# Patient Record
Sex: FEMALE | Race: OTHER | HISPANIC OR LATINO | Employment: FULL TIME | URBAN - METROPOLITAN AREA
[De-identification: names, ages, dates, MRNs, and addresses within clinical notes are randomized per-mention and may not be internally consistent; named-entity substitution may affect disease eponyms.]

---

## 2023-03-21 LAB
APPEARANCE, URINE: NORMAL
BILIRUBIN, URINE: NEGATIVE
BLOOD, URINE: NEGATIVE
COLOR, URINE: YELLOW
GLUCOSE, URINE: NEGATIVE MG/DL
KETONES, URINE: NEGATIVE MG/DL
LEUKOCYTE ESTERASE, URINE: NEGATIVE
NITRITE, URINE: NEGATIVE
PH, URINE: 6 (ref 5–8)
PROTEIN, URINE: NEGATIVE MG/DL
SPECIFIC GRAVITY, URINE: 1.01 (ref 1–1.03)
UROBILINOGEN, URINE: <2 MG/DL (ref 0–1.9)

## 2023-03-22 LAB — URINE CULTURE: NO GROWTH

## 2023-11-07 DIAGNOSIS — R39.9 URINARY SYMPTOM OR SIGN: ICD-10-CM

## 2023-11-08 ENCOUNTER — LAB (OUTPATIENT)
Dept: LAB | Facility: LAB | Age: 23
End: 2023-11-08
Payer: COMMERCIAL

## 2023-11-08 DIAGNOSIS — R39.9 URINARY SYMPTOM OR SIGN: ICD-10-CM

## 2023-11-08 PROCEDURE — 87086 URINE CULTURE/COLONY COUNT: CPT

## 2023-11-08 PROCEDURE — 87186 SC STD MICRODIL/AGAR DIL: CPT

## 2023-11-08 RX ORDER — NITROFURANTOIN 25; 75 MG/1; MG/1
100 CAPSULE ORAL 2 TIMES DAILY
Qty: 14 CAPSULE | Refills: 0 | Status: SHIPPED | OUTPATIENT
Start: 2023-11-08 | End: 2023-11-15

## 2023-11-10 LAB — BACTERIA UR CULT: ABNORMAL

## 2023-12-26 DIAGNOSIS — R39.9 URINARY SYMPTOM OR SIGN: ICD-10-CM

## 2023-12-27 ENCOUNTER — LAB (OUTPATIENT)
Dept: LAB | Facility: LAB | Age: 23
End: 2023-12-27
Payer: COMMERCIAL

## 2023-12-27 DIAGNOSIS — R39.9 URINARY SYMPTOM OR SIGN: ICD-10-CM

## 2023-12-27 PROCEDURE — 87186 SC STD MICRODIL/AGAR DIL: CPT

## 2023-12-27 PROCEDURE — 87086 URINE CULTURE/COLONY COUNT: CPT

## 2023-12-28 ENCOUNTER — OFFICE VISIT (OUTPATIENT)
Dept: PRIMARY CARE | Facility: CLINIC | Age: 23
End: 2023-12-28
Payer: COMMERCIAL

## 2023-12-28 VITALS
HEIGHT: 60 IN | DIASTOLIC BLOOD PRESSURE: 74 MMHG | BODY MASS INDEX: 22.97 KG/M2 | WEIGHT: 117 LBS | RESPIRATION RATE: 16 BRPM | OXYGEN SATURATION: 98 % | SYSTOLIC BLOOD PRESSURE: 110 MMHG | HEART RATE: 68 BPM

## 2023-12-28 DIAGNOSIS — Q05.7: ICD-10-CM

## 2023-12-28 DIAGNOSIS — N31.9 NEUROGENIC BLADDER: ICD-10-CM

## 2023-12-28 DIAGNOSIS — M53.3 SACRAL PAIN: ICD-10-CM

## 2023-12-28 DIAGNOSIS — Z00.00 ROUTINE HEALTH MAINTENANCE: Primary | ICD-10-CM

## 2023-12-28 DIAGNOSIS — Z98.2 VP (VENTRICULOPERITONEAL) SHUNT STATUS: ICD-10-CM

## 2023-12-28 PROBLEM — Z86.2 HISTORY OF ANEMIA: Status: ACTIVE | Noted: 2023-12-28

## 2023-12-28 PROBLEM — M21.6X1: Status: ACTIVE | Noted: 2023-12-28

## 2023-12-28 PROBLEM — Q05.8: Status: ACTIVE | Noted: 2023-12-28

## 2023-12-28 PROBLEM — H52.203 ASTIGMATISM OF BOTH EYES: Status: ACTIVE | Noted: 2023-12-28

## 2023-12-28 PROBLEM — F32.A DEPRESSION: Status: ACTIVE | Noted: 2023-12-28

## 2023-12-28 PROBLEM — H52.13 MYOPIA OF BOTH EYES: Status: ACTIVE | Noted: 2023-12-28

## 2023-12-28 PROBLEM — Z98.890 HISTORY OF SURGICAL PROCEDURE: Status: ACTIVE | Noted: 2023-12-28

## 2023-12-28 PROBLEM — Q05.9 SPINA BIFIDA (MULTI): Status: ACTIVE | Noted: 2023-12-28

## 2023-12-28 PROBLEM — R51.9 FREQUENT HEADACHES: Status: ACTIVE | Noted: 2023-12-28

## 2023-12-28 PROBLEM — R33.9 URINARY RETENTION WITH INCOMPLETE BLADDER EMPTYING: Status: ACTIVE | Noted: 2022-09-09

## 2023-12-28 PROBLEM — R41.840 INATTENTION: Status: ACTIVE | Noted: 2023-12-28

## 2023-12-28 PROBLEM — Q05.2 CLOSED LUMBAR SPINA BIFIDA WITH HYDROCEPHALUS (MULTI): Status: ACTIVE | Noted: 2023-12-28

## 2023-12-28 PROBLEM — F41.9 ANXIETY DISORDER: Status: ACTIVE | Noted: 2023-12-28

## 2023-12-28 PROBLEM — N21.0 BLADDER CALCULUS: Status: ACTIVE | Noted: 2023-02-03

## 2023-12-28 PROBLEM — N32.89 BLADDER TRABECULATION: Status: ACTIVE | Noted: 2023-12-28

## 2023-12-28 PROBLEM — K59.09 CHRONIC CONSTIPATION: Status: ACTIVE | Noted: 2023-12-28

## 2023-12-28 PROCEDURE — 99204 OFFICE O/P NEW MOD 45 MIN: CPT | Performed by: STUDENT IN AN ORGANIZED HEALTH CARE EDUCATION/TRAINING PROGRAM

## 2023-12-28 PROCEDURE — 1036F TOBACCO NON-USER: CPT | Performed by: STUDENT IN AN ORGANIZED HEALTH CARE EDUCATION/TRAINING PROGRAM

## 2023-12-28 ASSESSMENT — ENCOUNTER SYMPTOMS
NUMBNESS: 0
DIZZINESS: 0
MYALGIAS: 1
DIAPHORESIS: 0
DIARRHEA: 0
ARTHRALGIAS: 0
WEAKNESS: 0
CHILLS: 0
NECK STIFFNESS: 0
NECK PAIN: 0
LIGHT-HEADEDNESS: 0
DYSURIA: 0
SHORTNESS OF BREATH: 0
HEADACHES: 1
FEVER: 0
NAUSEA: 0
VOMITING: 0

## 2023-12-28 NOTE — PROGRESS NOTES
Subjective   Patient ID: Slade Hauser is a 23 y.o. female who presents for Establish Care.    Pt is here today to establish care. Pt has spina bifida and has a lot of back pain that started 2 weeks ago near her scar. Pt had surgery right after she was born    Pt has a shunt on the right side of her head from her head to her. Pt had shunt placed at 6 days old and was revised at 12years old.     Pt uses urinary catheter.          Review of Systems    Objective   /74   Pulse 68   Resp 16   Ht 1.524 m (5')   Wt 53.1 kg (117 lb)   SpO2 98%   BMI 22.85 kg/m²     Physical Exam    Assessment/Plan

## 2023-12-28 NOTE — PATIENT INSTRUCTIONS
Please call Dr. Willa Barriga's office to see if you can see her for follow up. The number to schedule an appointment is [phone].  Please get fasting labs at the  lab at your convenience.  Follow up with me in 1-3 months for physical, sooner if needed.   Please go to the ER if fevers, chills, sweats, weakness, numbness/tingling, loss of control of stool, any concerning/worsening symptoms.

## 2023-12-28 NOTE — PROGRESS NOTES
Subjective   Patient ID: Slade Hauser is a 23 y.o. female who presents for Establish Care.  She is here to establish care.  Past medical history significant for closed lumbar spina bifida with hydrocephalus status post  shunt, neurogenic bladder with self-catheterization, history of bladder calculus.    Pt has spina bifida and has sacral pain that started 2 weeks ago near her scar. Pain feels like a pulled muscle, like a bruise. Pain is non radiating. Pain present all day for last 2 weeks. No numbness, tingling, weakness. Unable to bend forward due to pain from low back. Used to [facility] but hasn't recently-she is unsure where to go for this. Reports having a shunt on the right side of her head from her head to her low back. Pt had shunt placed at 6 days old and was revised at 12 years old.      Pt uses urinary catheter to self catheter 4 times a day.        Review of Systems   Constitutional:  Negative for chills, diaphoresis and fever.   HENT:  Negative for hearing loss.    Eyes:  Negative for visual disturbance.   Respiratory:  Negative for shortness of breath.    Cardiovascular:  Negative for chest pain.   Gastrointestinal:  Negative for diarrhea, nausea and vomiting.   Endocrine: Negative for cold intolerance and heat intolerance.   Genitourinary:  Negative for dysuria.   Musculoskeletal:  Positive for myalgias (bilateral lower extremities for 2 weeks). Negative for arthralgias, neck pain and neck stiffness.   Skin:  Negative for rash.   Neurological:  Positive for headaches (intermittent headaches for 2 weeks). Negative for dizziness, syncope, weakness, light-headedness and numbness.       /74   Pulse 68   Resp 16   Ht 1.524 m (5')   Wt 53.1 kg (117 lb)   SpO2 98%   BMI 22.85 kg/m²     Objective   Physical Exam  Vitals reviewed.   HENT:      Head: Normocephalic.   Cardiovascular:      Rate and Rhythm: Normal rate and regular rhythm.   Pulmonary:      Effort: Pulmonary effort is  normal. No respiratory distress.      Breath sounds: Normal breath sounds.   Abdominal:      General: There is no distension.   Musculoskeletal:         General: No deformity.      Comments: Tenderness over sacrum bilaterally, minimal swelling over right portion of sacrum and no fluctuance-this area not more significantly tender than rest of sacrum. Erythema over sacrum and per patient has been unchanged.    Skin:     Coloration: Skin is not jaundiced.   Neurological:      General: No focal deficit present.      Mental Status: She is alert.      Cranial Nerves: No cranial nerve deficit.      Sensory: No sensory deficit.      Motor: No weakness.      Coordination: Coordination normal.      Gait: Gait normal.   Psychiatric:         Mood and Affect: Mood normal.         Behavior: Behavior normal.         Assessment/Plan   Problem List Items Addressed This Visit       Sacral pain    Relevant Orders    XR sacrum coccyx 2+ views     (ventriculoperitoneal) shunt status    Spina bifida (CMS/HCC)    Neurogenic bladder     Other Visit Diagnoses       Routine health maintenance    -  Primary    Relevant Orders    Comprehensive Metabolic Panel    Lipid Panel    CBC    TSH with reflex to Free T4 if abnormal        Spina bifida  Neurogenic bladder  - Sees urology, just saw Dr. Jensen August 2023.  -Has not seen neurosurgery since 2020, re referred to establish care and provided contact info  -I reached out to her neurosurgeon that she last saw to coordinate care.   -Sacral xray today   -Defer further imaging to neurosurgery given no significant neurologic findings  -ER for fevers, chills, sweats, weakness, numbness/tingling, loss of control of stool, any concerning/worsening symptoms.     Labs ordered  CPE 1-3 months, f/u sooner PRN  Lives at home with mom and dad and siblings. Works as .

## 2023-12-29 DIAGNOSIS — R39.9 URINARY SYMPTOM OR SIGN: ICD-10-CM

## 2023-12-29 RX ORDER — NITROFURANTOIN 25; 75 MG/1; MG/1
100 CAPSULE ORAL 2 TIMES DAILY
Qty: 14 CAPSULE | Refills: 0 | Status: SHIPPED | OUTPATIENT
Start: 2023-12-29 | End: 2024-01-02 | Stop reason: ALTCHOICE

## 2023-12-30 LAB — BACTERIA UR CULT: ABNORMAL

## 2024-01-02 RX ORDER — SULFAMETHOXAZOLE AND TRIMETHOPRIM 800; 160 MG/1; MG/1
1 TABLET ORAL 2 TIMES DAILY
Qty: 14 TABLET | Refills: 0 | Status: SHIPPED | OUTPATIENT
Start: 2024-01-02 | End: 2024-01-09

## 2024-01-03 ENCOUNTER — ANCILLARY PROCEDURE (OUTPATIENT)
Dept: RADIOLOGY | Facility: CLINIC | Age: 24
End: 2024-01-03
Payer: COMMERCIAL

## 2024-01-03 DIAGNOSIS — M53.3 SACRAL PAIN: ICD-10-CM

## 2024-01-03 PROCEDURE — 72220 X-RAY EXAM SACRUM TAILBONE: CPT

## 2024-01-03 PROCEDURE — 72220 X-RAY EXAM SACRUM TAILBONE: CPT | Performed by: RADIOLOGY

## 2024-01-19 ENCOUNTER — TELEMEDICINE (OUTPATIENT)
Dept: NEUROSURGERY | Facility: HOSPITAL | Age: 24
End: 2024-01-19
Payer: COMMERCIAL

## 2024-01-19 DIAGNOSIS — Z98.2 VP (VENTRICULOPERITONEAL) SHUNT STATUS: Primary | ICD-10-CM

## 2024-01-19 DIAGNOSIS — Q05.2 CLOSED LUMBAR SPINA BIFIDA WITH HYDROCEPHALUS (MULTI): ICD-10-CM

## 2024-01-19 PROBLEM — Q05.8: Status: RESOLVED | Noted: 2023-12-28 | Resolved: 2024-01-19

## 2024-01-19 PROCEDURE — 99213 OFFICE O/P EST LOW 20 MIN: CPT | Performed by: NEUROLOGICAL SURGERY

## 2024-01-19 PROCEDURE — 99213 OFFICE O/P EST LOW 20 MIN: CPT | Mod: GT | Performed by: NEUROLOGICAL SURGERY

## 2024-01-19 ASSESSMENT — ENCOUNTER SYMPTOMS: BACK PAIN: 1

## 2024-01-19 NOTE — ASSESSMENT & PLAN NOTE
Back and leg pain with increased UTI frequency could be secondary to retethering. Will order MRI L-spine to evaluate and will call with results.

## 2024-01-19 NOTE — PROGRESS NOTES
Subjective     Slade Hauser is a 23 y.o.  female presenting for their annual myelo clinic visit. 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     Their hydrocephalus was treated with a shunt. The shunt was inserted in infancy, last revised in 2012 when they presented for distal lengthening. They have a fixed pressure valve.    Current symptoms include: back pain and leg weakness.    They are currently ambulatory.  They cath q 4 hours. She has UTI's frequently, she feels an increased frequency lately.  She is working as a .    Review of Systems   Musculoskeletal:  Positive for back pain.   All other systems reviewed and are negative.      Objective   There were no vitals taken for this visit.    Physical Exam:  Able to stand, gait steady, high arches in her feet with limited dorsi/plantarflexion.  Incision without any retraction.      Assessment/Plan   Slade Hauser is a 23 y.o. with a history of myelomeningocele with hydrocephalus.  I have no concerns for shunt malfunction at this time. I have concerns for retethering at this time.    I would like to order imaging to evaluate their spinal cord for tethering. I will see them annually in myelo clinic. They have been instructed to call with any concerns in the interim. We reviewed the concerning symptoms to watch out for including headache, nausea/vomiting, worsening weakness or numbness, back pain, sleepiness, worsening scoliosis, worsening bowel or bladder dysfunction, increasing frequencies of urinary tract infections, difficulty swallowing, or other changes from baseline.    Problem List Items Addressed This Visit             ICD-10-CM     (ventriculoperitoneal) shunt status - Primary Z98.2    Relevant Orders    MR lumbar spine wo IV contrast    Closed lumbar spina bifida with hydrocephalus (CMS/HCC) Q05.2     Back and leg pain with increased UTI frequency could be secondary to retethering. Will order MRI L-spine  to evaluate and will call with results.         Relevant Orders    MR lumbar spine wo IV contrast

## 2024-02-09 ENCOUNTER — APPOINTMENT (OUTPATIENT)
Dept: RADIOLOGY | Facility: CLINIC | Age: 24
End: 2024-02-09
Payer: COMMERCIAL

## 2024-02-23 ENCOUNTER — APPOINTMENT (OUTPATIENT)
Dept: RADIOLOGY | Facility: CLINIC | Age: 24
End: 2024-02-23
Payer: COMMERCIAL

## 2024-03-08 ENCOUNTER — APPOINTMENT (OUTPATIENT)
Dept: RADIOLOGY | Facility: CLINIC | Age: 24
End: 2024-03-08
Payer: COMMERCIAL

## 2024-03-19 ENCOUNTER — HOSPITAL ENCOUNTER (OUTPATIENT)
Dept: RADIOLOGY | Facility: CLINIC | Age: 24
Discharge: HOME | End: 2024-03-19
Payer: COMMERCIAL

## 2024-03-19 DIAGNOSIS — Z98.2 VP (VENTRICULOPERITONEAL) SHUNT STATUS: ICD-10-CM

## 2024-03-19 DIAGNOSIS — Q05.2 CLOSED LUMBAR SPINA BIFIDA WITH HYDROCEPHALUS (MULTI): ICD-10-CM

## 2024-03-19 PROCEDURE — 72148 MRI LUMBAR SPINE W/O DYE: CPT | Performed by: RADIOLOGY

## 2024-03-19 PROCEDURE — 72148 MRI LUMBAR SPINE W/O DYE: CPT

## 2024-04-02 DIAGNOSIS — N39.0 CHRONIC UTI: ICD-10-CM

## 2024-04-02 DIAGNOSIS — N39.0 ACUTE UTI: Primary | ICD-10-CM

## 2024-04-02 RX ORDER — SULFAMETHOXAZOLE AND TRIMETHOPRIM 800; 160 MG/1; MG/1
1 TABLET ORAL 2 TIMES DAILY
Qty: 14 TABLET | Refills: 0 | Status: SHIPPED | OUTPATIENT
Start: 2024-04-02 | End: 2024-04-09

## 2024-04-17 ENCOUNTER — HOME INFUSION (OUTPATIENT)
Dept: INFUSION THERAPY | Age: 24
End: 2024-04-17

## 2024-04-17 NOTE — PROGRESS NOTES
Received notice from  that insurance has been termed since 02/29/24. Patient unable to afford self-pay rates. Pharmacy will be unable to provide gentamicin bladder irrigations starting this week.  Chat sent to Dr Lehman and Dr Gitlin regarding inability to provide medications.  Fill voided for 04/22.    Will keep chart active in case of change in insurance status or new orders.

## 2024-04-24 ENCOUNTER — TELEMEDICINE (OUTPATIENT)
Dept: NEUROSURGERY | Facility: HOSPITAL | Age: 24
End: 2024-04-24

## 2024-04-24 DIAGNOSIS — Z98.2 VP (VENTRICULOPERITONEAL) SHUNT STATUS: Primary | ICD-10-CM

## 2024-04-24 DIAGNOSIS — R33.9 URINARY RETENTION WITH INCOMPLETE BLADDER EMPTYING: ICD-10-CM

## 2024-04-24 DIAGNOSIS — Q06.8 TETHERED SPINAL CORD (MULTI): ICD-10-CM

## 2024-04-24 DIAGNOSIS — Q05.2 CLOSED LUMBAR SPINA BIFIDA WITH HYDROCEPHALUS (MULTI): ICD-10-CM

## 2024-04-24 PROCEDURE — 99214 OFFICE O/P EST MOD 30 MIN: CPT | Mod: 95 | Performed by: NEUROLOGICAL SURGERY

## 2024-04-24 PROCEDURE — 99214 OFFICE O/P EST MOD 30 MIN: CPT | Performed by: NEUROLOGICAL SURGERY

## 2024-04-24 ASSESSMENT — ENCOUNTER SYMPTOMS
FREQUENCY: 1
BACK PAIN: 1

## 2024-04-24 NOTE — PROGRESS NOTES
Subjective     Slade Hauser is a 23 y.o.  female presenting for their annual myelo clinic visit. 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     Their hydrocephalus was treated with a shunt. The shunt was inserted in infancy, last revised in 2012 when they presented for distal lengthening. They have a fixed pressure valve.    Current symptoms include: back pain and leg weakness.  Slade reports leg weakness being better than previously reported.      They are currently ambulatory.  They cath q 4 hours. She has UTI's frequently, she feels an increased frequency lately, Katy reports that she is having these UTIs as frequent as every 3 months and is finishing up a course of antibiotics now for it. No change in her cath technique. She denies change in her bowel habits.    She is working as a  and working longer hours.  She reports after these hours she is having increased back pain. She describes this in the midline, an aching sensation. It is worse when standing for prolonged periods. She has pain with both flexion and extension, worse with flexion. Worsening through the day.     Review of Systems   Genitourinary:  Positive for frequency.   Musculoskeletal:  Positive for back pain.   All other systems reviewed and are negative.      Objective   There were no vitals taken for this visit.    No exam performed    Imaging: Her MRI was personally reviewed which demonstrates significant scarring at the level of her placode and a spondylolisthesis at L5-S1.       Assessment/Plan   Slade Hauser is a 23 y.o. with a history of myelomeningocele with hydrocephalus.      Problem List Items Addressed This Visit             ICD-10-CM     (ventriculoperitoneal) shunt status - Primary Z98.2    Urinary retention with incomplete bladder emptying R33.9    Closed lumbar spina bifida with hydrocephalus (Multi) Q05.2     In reviewing her MRI, she does have a significantly low-lying  placode with scar tissue that is visible.  Certainly her urinary symptoms would support the diagnosis of a tethered cord.  However she does have spondylolisthesis of L5 on S1 that I need to investigate further to ensure that this is not the primary cause of her back pain.  Would like to order lumbar spine x-rays with flexion-extension views to evaluate for dynamic instability.  I will also obtain oblique views to evaluate for pars defects.  If she does have instability, it is possible that both the tethered cord and instability would need to be addressed concurrently.  If she does not have any instability, then would be able to proceed with just a tethered cord release.  I will plan on seeing her in clinic for an in person evaluation with x-rays to discuss plans for intervention.          Other Visit Diagnoses         Codes    Tethered spinal cord (Multi)     Q06.8    Relevant Orders    XR lumbar spine 6+ views including oblique flexion extension

## 2024-04-24 NOTE — ASSESSMENT & PLAN NOTE
In reviewing her MRI, she does have a significantly low-lying placode with scar tissue that is visible.  Certainly her urinary symptoms would support the diagnosis of a tethered cord.  However she does have spondylolisthesis of L5 on S1 that I need to investigate further to ensure that this is not the primary cause of her back pain.  Would like to order lumbar spine x-rays with flexion-extension views to evaluate for dynamic instability.  I will also obtain oblique views to evaluate for pars defects.  If she does have instability, it is possible that both the tethered cord and instability would need to be addressed concurrently.  If she does not have any instability, then would be able to proceed with just a tethered cord release.  I will plan on seeing her in clinic for an in person evaluation with x-rays to discuss plans for intervention.

## 2024-04-25 ENCOUNTER — HOME INFUSION (OUTPATIENT)
Dept: INFUSION THERAPY | Age: 24
End: 2024-04-25

## 2024-04-25 NOTE — PROGRESS NOTES
"PATIENT STILL DOES NOT HAVE ACTIVE INSURANCE.  PHARMACY PHONED AND SPOKE WITH MOM ASKING IF SHE KNEW ANYTING AND SHE TOLD ME \"EPIFANIO WOULD HAVE TO CALL US BACK\" AND HUNG UP.  WE ARE NOT ABLE TO SEND OUT ANY FURTHER GENT BI AT THIS TIME.  DSL  "

## 2024-05-10 ENCOUNTER — APPOINTMENT (OUTPATIENT)
Dept: NEUROSURGERY | Facility: HOSPITAL | Age: 24
End: 2024-05-10

## 2024-05-17 ENCOUNTER — HOSPITAL ENCOUNTER (OUTPATIENT)
Dept: RADIOLOGY | Facility: CLINIC | Age: 24
Discharge: HOME | End: 2024-05-17
Payer: MEDICARE

## 2024-05-17 DIAGNOSIS — Q06.8 TETHERED SPINAL CORD (MULTI): ICD-10-CM

## 2024-05-17 PROCEDURE — 72110 X-RAY EXAM L-2 SPINE 4/>VWS: CPT | Performed by: RADIOLOGY

## 2024-05-17 PROCEDURE — 72114 X-RAY EXAM L-S SPINE BENDING: CPT

## 2024-05-23 ASSESSMENT — ENCOUNTER SYMPTOMS
FREQUENCY: 1
BACK PAIN: 1

## 2024-05-23 NOTE — PROGRESS NOTES
"Subjective     Jenesy ABHI Hauser is a 23 y.o.  female presenting for follow up after a recent MRI and x-rays. 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    Their hydrocephalus was treated with a shunt. The shunt was inserted in infancy, last revised in 2012 when they presented for distal lengthening. They have a fixed pressure valve. She caths q4 hours with increasing frequency of UTIs. Primary concern is back pain, she works as a  and has pain with flexion/extension and worse at the end of the day.    She had a previous bladder augmentation around age 12 and caths from below. She is also having to cath more frequently and now needs to cath about every 2 hours for the past 3 months.     Current symptoms include: back pain and leg weakness.  Slade reports leg weakness being better than previously reported.        Review of Systems   Genitourinary:  Positive for frequency.   Musculoskeletal:  Positive for back pain.   All other systems reviewed and are negative.      Objective   /74 (BP Location: Right arm, Patient Position: Sitting)   Pulse 93   Temp 36.9 °C (98.5 °F) (Oral)   Ht 1.53 m (5' 0.24\")   Wt 52.1 kg (114 lb 13.8 oz)   BMI 22.26 kg/m²     General: awake, alert, appropriately interactive     HEENT: normocephalic, neck supple, sclera non-icteric, her shunt pumps and refills, she has tenderness around her shunt     Neuro: Pupils equally round and reactive to light, extra-ocular movements are intact, facial sensation intact bilaterally, face is symmetric, hearing is intact to finger rub bilaterally, palate elevates symmetrically, tongue is midline with trace fasciculations. Extremities are full strength in bilateral upper and lower extremities in all muscle groups with normal bulk and tone throughout except for weak right hamstring/quad 4/5, and bilateral 3/5 dorsiflexion/plantarflexion   Imaging: Her MRI was personally reviewed which demonstrates " significant scarring at the level of her placode and a spondylolisthesis at L5-S1.  Her x-rays were reviewed which demonstrate no evidence of dynamic instability.      Assessment/Plan   Jenpawel Hauser is a 23 y.o. with a history of myelomeningocele with hydrocephalus.      Problem List Items Addressed This Visit             ICD-10-CM     (ventriculoperitoneal) shunt status - Primary Z98.2    Closed lumbar spina bifida with hydrocephalus (Multi) Q05.2     We discussed her symptoms which include worsening back pain over time, as well as concern for worsening bladder function with an increasing need to catheterize more frequently and an increasing frequency of urinary tract infections despite her prior bladder augmentation.  I am concerned that these are indicative that she has some retethering.  We discussed that this is causing a persistent stretch on her spinal cord and can cause an injury to the nerves that run the largest on her spine.  I did discuss that treatment for this would involve surgery to release the tethered area of scar tissue.  This would include monitoring of nerve transmission signal from her brain down so that I can clearly assess whether I am nearby any of the nerves that supply motor function to her legs.  I discussed that I am not able to individually monitor sensory nerves, and so sometimes following surgery there could be increased areas of patchy numbness.  Otherwise, I described risks of surgeries including the potential risk of a spinal fluid leak, and the need to remain flat for 2 days after surgery.  I discussed that I typically do this with plastic surgery to get some assistance with closure.  I would anticipate somewhere between a 4 to 5-day admission following this procedure.  She would like to have some time to think about this as well as evaluate her symptoms.  We will plan on following up in 3 months with another appointment to see how she is doing.    I will reach out to her  urologist to see if they are able to get her in for any more formal testing sooner than when she is able to schedule through [facility].  I am also happy to provide what information I can do to assist with her being able to obtain catheters, or get any sort of supplemental insurance that may help with being able to offset the costs of this medical equipment.

## 2024-05-24 ENCOUNTER — OFFICE VISIT (OUTPATIENT)
Dept: NEUROSURGERY | Facility: HOSPITAL | Age: 24
End: 2024-05-24
Payer: MEDICARE

## 2024-05-24 VITALS
HEIGHT: 60 IN | TEMPERATURE: 98.5 F | SYSTOLIC BLOOD PRESSURE: 118 MMHG | HEART RATE: 93 BPM | BODY MASS INDEX: 22.55 KG/M2 | DIASTOLIC BLOOD PRESSURE: 74 MMHG | WEIGHT: 114.86 LBS

## 2024-05-24 DIAGNOSIS — Z98.2 VP (VENTRICULOPERITONEAL) SHUNT STATUS: Primary | ICD-10-CM

## 2024-05-24 DIAGNOSIS — Q05.2 CLOSED LUMBAR SPINA BIFIDA WITH HYDROCEPHALUS (MULTI): ICD-10-CM

## 2024-05-24 PROCEDURE — 99214 OFFICE O/P EST MOD 30 MIN: CPT | Performed by: NEUROLOGICAL SURGERY

## 2024-05-24 PROCEDURE — 99214 OFFICE O/P EST MOD 30 MIN: CPT | Mod: 57 | Performed by: NEUROLOGICAL SURGERY

## 2024-05-24 ASSESSMENT — ENCOUNTER SYMPTOMS
OCCASIONAL FEELINGS OF UNSTEADINESS: 0
DEPRESSION: 0
LOSS OF SENSATION IN FEET: 0

## 2024-05-24 NOTE — LETTER
May 24, 2024     Patient: Slade Hauser   YOB: 2000   Date of Visit: 5/24/2024       To Whom It May Concern:    Slade Hauser was seen in my clinic on 5/24/2024 at 11:15 am. Please excuse Slade for her absence from work on this day to make the appointment.    If you have any questions or concerns, please don't hesitate to call.         Sincerely,         Willa Barriga MD MPH        CC:   No Recipients

## 2024-05-24 NOTE — ASSESSMENT & PLAN NOTE
We discussed her symptoms which include worsening back pain over time, as well as concern for worsening bladder function with an increasing need to catheterize more frequently and an increasing frequency of urinary tract infections despite her prior bladder augmentation.  I am concerned that these are indicative that she has some retethering.  We discussed that this is causing a persistent stretch on her spinal cord and can cause an injury to the nerves that run the largest on her spine.  I did discuss that treatment for this would involve surgery to release the tethered area of scar tissue.  This would include monitoring of nerve transmission signal from her brain down so that I can clearly assess whether I am nearby any of the nerves that supply motor function to her legs.  I discussed that I am not able to individually monitor sensory nerves, and so sometimes following surgery there could be increased areas of patchy numbness.  Otherwise, I described risks of surgeries including the potential risk of a spinal fluid leak, and the need to remain flat for 2 days after surgery.  I discussed that I typically do this with plastic surgery to get some assistance with closure.  I would anticipate somewhere between a 4 to 5-day admission following this procedure.  She would like to have some time to think about this as well as evaluate her symptoms.  We will plan on following up in 3 months with another appointment to see how she is doing.    I will reach out to her urologist to see if they are able to get her in for any more formal testing sooner than when she is able to schedule through [facility].  I am also happy to provide what information I can do to assist with her being able to obtain catheters, or get any sort of supplemental insurance that may help with being able to offset the costs of this medical equipment.

## 2024-06-04 ENCOUNTER — HOME INFUSION (OUTPATIENT)
Dept: INFUSION THERAPY | Age: 24
End: 2024-06-04
Payer: MEDICARE

## 2024-06-04 NOTE — PROGRESS NOTES
PATIENT INFORMED ME SHE IS NO LONGER DOING THE GENT BI.  SAID HER MD IS AWARE. PHARMACY WILL DISCHARGE FROM SERVICES. BAUDILIO

## 2024-06-13 ENCOUNTER — APPOINTMENT (OUTPATIENT)
Dept: PRIMARY CARE | Facility: CLINIC | Age: 24
End: 2024-06-13
Payer: MEDICARE

## 2024-06-18 DIAGNOSIS — R39.9 URINARY SYMPTOM OR SIGN: ICD-10-CM

## 2024-06-19 ENCOUNTER — LAB (OUTPATIENT)
Dept: LAB | Facility: LAB | Age: 24
End: 2024-06-19
Payer: MEDICARE

## 2024-06-19 DIAGNOSIS — R39.9 URINARY SYMPTOM OR SIGN: ICD-10-CM

## 2024-06-19 PROCEDURE — 87086 URINE CULTURE/COLONY COUNT: CPT

## 2024-06-19 PROCEDURE — 87186 SC STD MICRODIL/AGAR DIL: CPT

## 2024-06-22 LAB — BACTERIA UR CULT: ABNORMAL

## 2024-06-24 DIAGNOSIS — N30.00 ACUTE CYSTITIS WITHOUT HEMATURIA: Primary | ICD-10-CM

## 2024-06-24 RX ORDER — SULFAMETHOXAZOLE AND TRIMETHOPRIM 800; 160 MG/1; MG/1
1 TABLET ORAL 2 TIMES DAILY
Qty: 14 TABLET | Refills: 0 | Status: SHIPPED | OUTPATIENT
Start: 2024-06-24 | End: 2024-07-01

## 2024-06-28 ENCOUNTER — APPOINTMENT (OUTPATIENT)
Dept: PRIMARY CARE | Facility: CLINIC | Age: 24
End: 2024-06-28
Payer: MEDICARE

## 2024-08-14 ENCOUNTER — APPOINTMENT (OUTPATIENT)
Dept: UROLOGY | Facility: CLINIC | Age: 24
End: 2024-08-14
Payer: MEDICARE

## 2024-08-14 VITALS
DIASTOLIC BLOOD PRESSURE: 81 MMHG | TEMPERATURE: 97.9 F | WEIGHT: 120 LBS | SYSTOLIC BLOOD PRESSURE: 123 MMHG | HEIGHT: 60 IN | HEART RATE: 69 BPM | BODY MASS INDEX: 23.56 KG/M2

## 2024-08-14 DIAGNOSIS — R82.90 ABNORMAL URINALYSIS: ICD-10-CM

## 2024-08-14 DIAGNOSIS — N31.9 NEUROGENIC BLADDER: ICD-10-CM

## 2024-08-14 LAB
POC APPEARANCE, URINE: CLEAR
POC BILIRUBIN, URINE: NEGATIVE
POC BLOOD, URINE: NEGATIVE
POC COLOR, URINE: YELLOW
POC GLUCOSE, URINE: NEGATIVE MG/DL
POC KETONES, URINE: NEGATIVE MG/DL
POC LEUKOCYTES, URINE: ABNORMAL
POC NITRITE,URINE: POSITIVE
POC PH, URINE: 7 PH
POC PROTEIN, URINE: ABNORMAL MG/DL
POC SPECIFIC GRAVITY, URINE: 1.02
POC UROBILINOGEN, URINE: 0.2 EU/DL

## 2024-08-14 PROCEDURE — 3008F BODY MASS INDEX DOCD: CPT | Performed by: STUDENT IN AN ORGANIZED HEALTH CARE EDUCATION/TRAINING PROGRAM

## 2024-08-14 PROCEDURE — 81003 URINALYSIS AUTO W/O SCOPE: CPT | Performed by: STUDENT IN AN ORGANIZED HEALTH CARE EDUCATION/TRAINING PROGRAM

## 2024-08-14 PROCEDURE — 1036F TOBACCO NON-USER: CPT | Performed by: STUDENT IN AN ORGANIZED HEALTH CARE EDUCATION/TRAINING PROGRAM

## 2024-08-14 PROCEDURE — 87086 URINE CULTURE/COLONY COUNT: CPT

## 2024-08-14 PROCEDURE — 99214 OFFICE O/P EST MOD 30 MIN: CPT | Performed by: STUDENT IN AN ORGANIZED HEALTH CARE EDUCATION/TRAINING PROGRAM

## 2024-08-14 NOTE — PROGRESS NOTES
Scribed for Dr. Moose Chun by Raghav Bartlett. I, Dr. Moose Chun have personally reviewed and agreed with the information entered by the Virtual Scribe. 08/14/24.    ASSESSMENT:  Problem List Items Addressed This Visit       Neurogenic bladder    Relevant Orders    POCT UA Automated manually resulted (Completed)    CT abdomen pelvis wo IV contrast     Other Visit Diagnoses       Abnormal urinalysis        Relevant Orders    Urine Culture           PLAN:  #NGB  #Bladder stones  Opts to proceed with CT A&P wo contrast to address anatomy and r/o stones.   RTC in 2-3 weeks cystoscopy and to review CT results.   Perform UDS in 1-2 months.     All questions were answered to the patient’s satisfaction.  Patient agrees with the plan and wishes to proceed.  Continue follow-up for ongoing care of his chronic medical conditions.       History of Present Illness (HPI):  Slade presents as a new patient for an evaluation.  The patient’s EMR has been reviewed.  Lives in [place], [place].   Occupation: .    TODAY: (08/14/24)  Hx of spina bifida, NGB s/p bladder augment with Leticia Roldan, and bladder stones.   Previously followed by Dr. Jensen and Dr. Martinez.   Continues ISC 4x daily, caths per urethra.   Performs bladder instillations daily.   Has had some sediment return recently.   Denotes recurring UTI's over the past month.  Associated with low back pain.   Voiding frequency q2hrs, having to cath more frequently as well.   Has been running out of catheters.  Last passed stones >10 years ago.     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    Their hydrocephalus was treated with a shunt. The shunt was inserted in infancy, last revised in 2012 when they presented for distal lengthening. They have a fixed pressure valve. Bladder augmentation around age 12 and caths.  FH: Denies  SH: Non-smoker.     Past Medical History:   Diagnosis Date    Headache, unspecified 05/13/2019    Frequent  headaches    Lumbar spina bifida with hydrocephalus (Multi) 09/09/2022    Closed lumbar spina bifida with hydrocephalus    Other symptoms and signs involving the musculoskeletal system 05/13/2019    Limb weakness    Personal history of diseases of the blood and blood-forming organs and certain disorders involving the immune mechanism     History of anemia    Personal history of other diseases of urinary system     History of bladder problems    Personal history of other specified (corrected) congenital malformations of nervous system and sense organs     History of spina bifida    Presence of cerebrospinal fluid drainage device 09/30/2021     (ventriculoperitoneal) shunt status     Past Surgical History:   Procedure Laterality Date    BLADDER SURGERY  09/18/2013    Bladder Surgery    OTHER SURGICAL HISTORY  09/18/2013    Cranial Tap Of Shunt Tubing    OTHER SURGICAL HISTORY  09/12/2016    Bladder Enterocystoplasty     No family history on file.  Social History     Tobacco Use   Smoking Status Never   Smokeless Tobacco Never     No current outpatient medications on file.     No current facility-administered medications for this visit.     Allergies   Allergen Reactions    Animal Dander Unknown    House Dust Unknown    Latex Unknown    Pollen Extracts Unknown     Past medical, surgical, family and social history in the chart was reviewed and is accurate including any additions to what is in this HPI.    REVIEW OF SYSTEMS (ROS):   Constitutional: denies any unintentional weight loss or change in strength.  Integumentary: denies any rashes or pruritus.  Eyes: denies any double vision or eye pain.  Ear/Nose/Mouth/Throat: denies any nosebleeds or gum bleeds.  Cardiovascular: denies any chest pain or syncope.  Respiratory: denies hemoptysis.  Gastrointestinal: denies nausea or vomiting.  Musculoskeletal: denies muscle cramping or weakness.  Neurologic: denies convulsions or seizures.  Hematologic/Lymphatic: denies  bleeding tendencies.  Endocrine: denies heat/cold intolerance.  All other systems have been reviewed and are negative unless otherwise noted in the HPI.     OBJECTIVE:  Visit Vitals  /81   Pulse 69   Temp 36.6 °C (97.9 °F)     PHYSICAL EXAM:  Constitutional: No obvious distress.  Eyes: Non-injected conjunctiva, sclera clear, EOMI.  Ears/Nose/Mouth/Throat: No obvious drainage per ears or nose.  Cardiovascular: Extremities are warm and well perfused. No edema, cyanosis or pallor.  Respiratory: No audible wheezing/stridor; respirations do not appear labored.  Gastrointestinal: Abdomen soft, not distended.  Musculoskeletal: Normal ROM of extremities.  Skin: No obvious rashes or open sores.  Neurologic: Alert and oriented, CN 2-12 grossly intact.  Psychiatric: Answers questions appropriately with normal affect.  Hematologic/Lymphatic/Immunologic: No obvious bruises or sites of spontaneous bleeding.  Genitourinary: No CVA tenderness, bladder not palpable.     LABS & IMAGING:  Lab Results   Component Value Date     06/11/2018    K 4.4 06/11/2018     (H) 06/11/2018    CREATININE 0.57 06/11/2018    BUN 10 06/11/2018    CO2 23 06/11/2018     Scribed for Dr. Moose Chun by Raghav Bartlett.  I, Dr. Moose Chun have personally reviewed and agreed with the information entered by the Virtual Scribe. 08/14/24.

## 2024-08-15 LAB — BACTERIA UR CULT: ABNORMAL

## 2024-08-20 DIAGNOSIS — R39.9 UTI SYMPTOMS: ICD-10-CM

## 2024-08-20 NOTE — PROGRESS NOTES
Pt's urine she gave in clinic last week was contaminated , pt having burning with urination still .  Pt will give another sample for culture. ;Dr Chun alerted . Pt will start bactrim DS bid x 10 days now .

## 2024-08-21 RX ORDER — SULFAMETHOXAZOLE AND TRIMETHOPRIM 800; 160 MG/1; MG/1
1 TABLET ORAL 2 TIMES DAILY
Qty: 20 TABLET | Refills: 0 | Status: SHIPPED | OUTPATIENT
Start: 2024-08-21 | End: 2024-08-31

## 2024-08-22 ENCOUNTER — LAB (OUTPATIENT)
Dept: LAB | Facility: LAB | Age: 24
End: 2024-08-22
Payer: MEDICARE

## 2024-08-22 DIAGNOSIS — N39.0 CHRONIC UTI: ICD-10-CM

## 2024-08-22 DIAGNOSIS — R39.9 UTI SYMPTOMS: ICD-10-CM

## 2024-08-22 LAB
APPEARANCE UR: ABNORMAL
BACTERIA #/AREA URNS AUTO: ABNORMAL /HPF
BILIRUB UR STRIP.AUTO-MCNC: NEGATIVE MG/DL
CA PHOS CRY #/AREA UR COMP ASSIST: ABNORMAL /HPF
COLOR UR: ABNORMAL
GLUCOSE UR STRIP.AUTO-MCNC: NORMAL MG/DL
KETONES UR STRIP.AUTO-MCNC: NEGATIVE MG/DL
LEUKOCYTE ESTERASE UR QL STRIP.AUTO: ABNORMAL
NITRITE UR QL STRIP.AUTO: ABNORMAL
PH UR STRIP.AUTO: 7 [PH]
PROT UR STRIP.AUTO-MCNC: ABNORMAL MG/DL
RBC # UR STRIP.AUTO: ABNORMAL /UL
RBC #/AREA URNS AUTO: >20 /HPF
SP GR UR STRIP.AUTO: 1.01
SQUAMOUS #/AREA URNS AUTO: ABNORMAL /HPF
UROBILINOGEN UR STRIP.AUTO-MCNC: NORMAL MG/DL
WBC #/AREA URNS AUTO: ABNORMAL /HPF

## 2024-08-22 PROCEDURE — 87086 URINE CULTURE/COLONY COUNT: CPT

## 2024-08-22 PROCEDURE — 81001 URINALYSIS AUTO W/SCOPE: CPT

## 2024-08-23 LAB — BACTERIA UR CULT: ABNORMAL

## 2024-08-29 ENCOUNTER — HOSPITAL ENCOUNTER (OUTPATIENT)
Dept: RADIOLOGY | Facility: HOSPITAL | Age: 24
Discharge: HOME | End: 2024-08-29
Payer: MEDICARE

## 2024-08-29 DIAGNOSIS — N31.9 NEUROGENIC BLADDER: ICD-10-CM

## 2024-08-29 PROCEDURE — 74176 CT ABD & PELVIS W/O CONTRAST: CPT

## 2024-09-04 ENCOUNTER — APPOINTMENT (OUTPATIENT)
Dept: UROLOGY | Facility: CLINIC | Age: 24
End: 2024-09-04
Payer: MEDICARE

## 2024-09-04 VITALS
WEIGHT: 116 LBS | HEIGHT: 60 IN | BODY MASS INDEX: 22.78 KG/M2 | TEMPERATURE: 98.1 F | HEART RATE: 75 BPM | DIASTOLIC BLOOD PRESSURE: 77 MMHG | SYSTOLIC BLOOD PRESSURE: 117 MMHG

## 2024-09-04 DIAGNOSIS — N21.0 BLADDER CALCULUS: Primary | ICD-10-CM

## 2024-09-04 DIAGNOSIS — N31.9 NEUROGENIC BLADDER: ICD-10-CM

## 2024-09-04 LAB
POC APPEARANCE, URINE: ABNORMAL
POC BILIRUBIN, URINE: NEGATIVE
POC BLOOD, URINE: ABNORMAL
POC COLOR, URINE: YELLOW
POC GLUCOSE, URINE: NEGATIVE MG/DL
POC KETONES, URINE: NEGATIVE MG/DL
POC LEUKOCYTES, URINE: ABNORMAL
POC NITRITE,URINE: POSITIVE
POC PH, URINE: 6.5 PH
POC PROTEIN, URINE: ABNORMAL MG/DL
POC SPECIFIC GRAVITY, URINE: 1.02
POC UROBILINOGEN, URINE: 0.2 EU/DL

## 2024-09-04 PROCEDURE — 81003 URINALYSIS AUTO W/O SCOPE: CPT | Performed by: STUDENT IN AN ORGANIZED HEALTH CARE EDUCATION/TRAINING PROGRAM

## 2024-09-04 PROCEDURE — 99214 OFFICE O/P EST MOD 30 MIN: CPT | Performed by: STUDENT IN AN ORGANIZED HEALTH CARE EDUCATION/TRAINING PROGRAM

## 2024-09-04 RX ORDER — VANCOMYCIN HYDROCHLORIDE 1 G/200ML
1000 INJECTION, SOLUTION INTRAVENOUS ONCE
OUTPATIENT
Start: 2024-09-04 | End: 2024-09-04

## 2024-09-04 RX ORDER — CIPROFLOXACIN 2 MG/ML
400 INJECTION, SOLUTION INTRAVENOUS ONCE
OUTPATIENT
Start: 2024-09-04 | End: 2024-09-04

## 2024-09-04 RX ORDER — SODIUM CHLORIDE, SODIUM LACTATE, POTASSIUM CHLORIDE, CALCIUM CHLORIDE 600; 310; 30; 20 MG/100ML; MG/100ML; MG/100ML; MG/100ML
20 INJECTION, SOLUTION INTRAVENOUS CONTINUOUS
OUTPATIENT
Start: 2024-09-04

## 2024-09-04 NOTE — PROGRESS NOTES
Scribed for Dr. Moose Chun by Raghav Bartlett. I, Dr. Moose Chun have personally reviewed and agreed with the information entered by the Virtual Scribe. 09/04/24.    ASSESSMENT:  Problem List Items Addressed This Visit       Neurogenic bladder    Relevant Medications    syringe, disposable, 60 mL syringe    Other Relevant Orders    Urodynamic studies    POCT UA Automated manually resulted (Completed)    Case Request Operating Room: Open Cystolithotomy (Completed)    Request for Pre-Admission Testing Visit    Basic Metabolic Panel    CBC    Bladder calculus - Primary    Relevant Medications    syringe, disposable, 60 mL syringe    Other Relevant Orders    Cystourethroscopy (Completed)    POCT UA Automated manually resulted (Completed)    Case Request Operating Room: Open Cystolithotomy (Completed)    Request for Pre-Admission Testing Visit    Basic Metabolic Panel    CBC     Other Visit Diagnoses       Urinary retention               PLAN:  #NGB ~ s/p bladder augment  #Bladder stones  #Urinary retention  S/p CT A&P and cystoscopy with me; see HPI.   Significant stone burden within her bladder.  Suspect probable source of her increased back pain and urinary issues + UTI's.     Discussed non-surgical vs surgical options.   Given numerous number and size of her stones.   Recommended open cystolithotomy for definitive treatment.   Risks, benefits, and complications discussed.   Post-op course and expectations reviewed.   Will schedule the OR appropriately. (Sept 10th//Oct 24th)      She will continue irrigating pre and post-op  She irrigates with normal saline TID with 50-60ml each time  Will need to increase amount and potentially the size of the catheter she uses to irrigate with    All questions were answered to the patient’s satisfaction.  Patient agrees with the plan and wishes to proceed.  Continue follow-up for ongoing care of his chronic medical conditions.    ADDENDUM 10/16/2024  Patient report significant  increase in frequency to nearly every 2 hours since the stone  Will need to increase her catheterization to 8 times daily for her urinary retention and reduced bladder capacity secondary to her large bladder stones  Will need increased supplies temporarily to accommodate those needs    Moose Chun MD         History of Present Illness (HPI):  Slade presents as a new patient for an evaluation.  The patient’s EMR has been reviewed.  Lives in [place], [place].   Occupation: .    Hx of spina bifida, NGB, rUTIs, and bladder stones.   S/p bladder augment with Dr. Marrufo.   Previously followed by Dr. Jensen and Dr. Martinez.   Continues ISC 4x daily, caths per urethra.   Performs bladder instillations daily.   Has had some sediment return recently.   Denotes recurring UTI's over the past month.    CT A&P (08/29/24) personally reviewed.   Punctate non-obstructing right renal stones (~ 2 mm)  No hydroureteronephrosis bilaterally.   Noted multiple bladder stones; largest measuring ~ 2.8 cm.   Large stone burden; appears worse compared to prior. (02/03/23)  Bladder wall thickening, similar to prior.     TODAY: (09/04/24)  Presents for follow up, CT results, and cystoscopy.   CT results reviewed with patient.     Cystoscopy performed:   Slade Hauser identified using two (2) forms of identification.  Procedure: diagnostic cystourethroscopy  Indications for procedure: rUTIs  Risks, benefits, and alternatives were discussed in detail.   Patient appears to understand and agrees to proceed.   Patient has signed the procedure consent form.     Cystoscopy findings:  Urethra: normal course and caliber, no evidence of stricture or lesion.  Bladder: normal capacity, no trabeculations, no diverticulum.  Numerous dependent bladder stones seen consistent with CT findings.   Post-cystoscopy: Patient tolerated procedure without complications.    TO REVIEW: Initial visit (08/14/24)  Hx of spina bifida, NGB s/p bladder augment  (Dr. Marrufo), rUTIs, and bladder stones.   Previously followed by Dr. Jensen and Dr. Martinez.   Continues ISC 4x daily, caths per urethra.   Performs bladder instillations daily.   Has had some sediment return recently.   Denotes recurring UTI's over the past month.  Associated with low back pain.   Voiding frequency q2hrs, having to cath more frequently as well.   Has been running out of catheters.  Last passed stones >10 years ago.     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    Their hydrocephalus was treated with a shunt. The shunt was inserted in infancy, last revised in 2012 when they presented for distal lengthening. They have a fixed pressure valve. Bladder augmentation around age 12 and caths.  FH: Denies  SH: Non-smoker.     Past Medical History:   Diagnosis Date    Headache, unspecified 05/13/2019    Frequent headaches    Lumbar spina bifida with hydrocephalus (Multi) 09/09/2022    Closed lumbar spina bifida with hydrocephalus    Other symptoms and signs involving the musculoskeletal system 05/13/2019    Limb weakness    Personal history of diseases of the blood and blood-forming organs and certain disorders involving the immune mechanism     History of anemia    Personal history of other diseases of urinary system     History of bladder problems    Personal history of other specified (corrected) congenital malformations of nervous system and sense organs     History of spina bifida    Presence of cerebrospinal fluid drainage device 09/30/2021     (ventriculoperitoneal) shunt status     Past Surgical History:   Procedure Laterality Date    BLADDER SURGERY  09/18/2013    Bladder Surgery    OTHER SURGICAL HISTORY  09/18/2013    Cranial Tap Of Shunt Tubing    OTHER SURGICAL HISTORY  09/12/2016    Bladder Enterocystoplasty     No family history on file.  Social History     Tobacco Use   Smoking Status Never   Smokeless Tobacco Never     No current outpatient medications on file.      No current facility-administered medications for this visit.     Allergies   Allergen Reactions    Animal Dander Unknown    House Dust Unknown    Latex Unknown    Pollen Extracts Unknown     Past medical, surgical, family and social history in the chart was reviewed and is accurate including any additions to what is in this HPI.    REVIEW OF SYSTEMS (ROS):   Constitutional: denies any unintentional weight loss or change in strength.  Integumentary: denies any rashes or pruritus.  Eyes: denies any double vision or eye pain.  Ear/Nose/Mouth/Throat: denies any nosebleeds or gum bleeds.  Cardiovascular: denies any chest pain or syncope.  Respiratory: denies hemoptysis.  Gastrointestinal: denies nausea or vomiting.  Musculoskeletal: denies muscle cramping or weakness.  Neurologic: denies convulsions or seizures.  Hematologic/Lymphatic: denies bleeding tendencies.  Endocrine: denies heat/cold intolerance.  All other systems have been reviewed and are negative unless otherwise noted in the HPI.     OBJECTIVE:  Visit Vitals  /77 (BP Location: Left arm, Patient Position: Sitting, BP Cuff Size: Adult)   Pulse 75   Temp 36.7 °C (98.1 °F) (Temporal)       PHYSICAL EXAM:  Constitutional: No obvious distress.  Eyes: Non-injected conjunctiva, sclera clear, EOMI.  Ears/Nose/Mouth/Throat: No obvious drainage per ears or nose.  Cardiovascular: Extremities are warm and well perfused. No edema, cyanosis or pallor.  Respiratory: No audible wheezing/stridor; respirations do not appear labored.  Gastrointestinal: Abdomen soft, not distended.  Musculoskeletal: Normal ROM of extremities.  Skin: No obvious rashes or open sores.  Neurologic: Alert and oriented, CN 2-12 grossly intact.  Psychiatric: Answers questions appropriately with normal affect.  Hematologic/Lymphatic/Immunologic: No obvious bruises or sites of spontaneous bleeding.  Genitourinary: No CVA tenderness, bladder not palpable.     LABS & IMAGING:  Lab Results    Component Value Date     06/11/2018    K 4.4 06/11/2018     (H) 06/11/2018    CREATININE 0.57 06/11/2018    BUN 10 06/11/2018    CO2 23 06/11/2018     Scribed for Dr. Moose Chun by Raghav Bartlett.  I, Dr. Moose Chun have personally reviewed and agreed with the information entered by the Virtual Scribe. 09/04/24.

## 2024-09-11 ENCOUNTER — APPOINTMENT (OUTPATIENT)
Dept: UROLOGY | Facility: CLINIC | Age: 24
End: 2024-09-11
Payer: MEDICARE

## 2024-09-18 ENCOUNTER — DOCUMENTATION (OUTPATIENT)
Dept: UROLOGY | Facility: CLINIC | Age: 24
End: 2024-09-18
Payer: MEDICARE

## 2024-09-18 NOTE — PROGRESS NOTES
Pt called to report her pharmacy has her saline solution to irrigate bladder , on back order . Pt states she has no more saline left and haven't flushed today .  Nurse told pt how to make saline solution at home with distilled water and salt .  Instructions given and will mail also . Until she gets supply from pharmacy . Pt understood

## 2024-09-25 DIAGNOSIS — N21.0 BLADDER CALCULUS: ICD-10-CM

## 2024-09-25 DIAGNOSIS — N31.9 NEUROGENIC BLADDER: ICD-10-CM

## 2024-09-27 RX ORDER — SODIUM CHLORIDE 0.9 G/100ML
60 IRRIGANT IRRIGATION 2 TIMES DAILY
Qty: 1000 ML | Refills: 11 | Status: SHIPPED | OUTPATIENT
Start: 2024-09-27

## 2024-10-03 ENCOUNTER — CLINICAL SUPPORT (OUTPATIENT)
Dept: PREADMISSION TESTING | Facility: HOSPITAL | Age: 24
End: 2024-10-03
Payer: MEDICARE

## 2024-10-03 NOTE — CPM/PAT NURSE NOTE
CPM/PAT Nurse Note      Name: Slade Hauser (Slade Hauser)  /Age: 2000/ y.o.       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    Neurogenic bladder     s/p bladder augment with Dr. Marrufo    Other symptoms and signs involving the musculoskeletal system 2019    Limb weakness    Personal history of diseases of the blood and blood-forming organs and certain disorders involving the immune mechanism     History of anemia    Personal history of other diseases of urinary system     History of bladder problems    Presence of cerebrospinal fluid drainage device 2021     (ventriculoperitoneal) shunt status    Urinary tract infection     recurring UTI's       Past Surgical History:   Procedure Laterality Date    BLADDER SURGERY  2013    Bladder Surgery    CYSTOURETHROSCOPY      OTHER SURGICAL HISTORY  2013    Cranial Tap Of Shunt Tubing    OTHER SURGICAL HISTORY  2016    Bladder Enterocystoplasty    VENTRICULOPERITONEAL SHUNT         Patient  has no history on file for sexual activity.    No family history on file.    Allergies   Allergen Reactions    Animal Dander Unknown    House Dust Unknown    Latex Unknown    Pollen Extracts Unknown       Prior to Admission medications    Medication Sig Start Date End Date Taking? Authorizing Provider   sodium chloride 0.9 % irrigation solution IRRIGATE WITH 60 ML AS DIRECTED 2 TIMES A DAY. 24   Moose Chun MD   syringe, disposable, 60 mL syringe 60 mL 2 times a day. 24   Moose Chun MD   sodium chloride (Sterile Saline) 0.9 % irrigation solution Irrigate with 60 mL as directed 2 times a day. 24  Moose Chun MD        PAT ROS     DASI Risk Score    No data to display        Caprini DVT Assessment    No data to display       Modified Frailty Index    No data to display       CHADS2 Stroke Risk  Current as of today        N/A 3 to 100%: High Risk   2 to < 3%: Medium Risk   0 to < 2%: Low Risk     Last Change: N/A          This score determines the patient's risk of having a stroke if the patient has atrial fibrillation.        This score is not applicable to this patient. Components are not calculated.          Revised Cardiac Risk Index    No data to display       Apfel Simplified Score    No data to display       Risk Analysis Index Results This Encounter    No data found in the last 10 encounters.     Scheduled for open cystolithotomy with Dr. Chun on 10/24/24.    PCP: Rogelio Huerta DO [facility] 12/28/23    Urology: Moose Troncoso MD [facility] 8/14/24 patient with Hx of spina bifida, NGB s/p bladder augment with Leticia Roldan, and bladder stones. Seen for evaluation of Voiding frequency q2hrs, having to cath more frequently as well which patient has been running out of catheters.  Per note on 9/4/24; “S/p CT A&P and cystoscopy with me; see HPI. Significant stone burden within her bladder.Suspect probable source of her increased back pain and urinary issues + UTI's.”     Pediatric Neurosurgery: Willa Barriga MD [facility] 5/24/24 seen for history of myelomeningocele with hydrocephalus.     - CT ABDOMEN PELVIS WO IV CONTRAST; 8/29/2024   IMPRESSION:  1. When compared to the prior examination dated 02/03/2023, there has  been interval development of multiple bladder stones, largest of  which measures 2.8 cm. Bladder wall thickening is similar to the  prior examination compatible patient's given history of neurogenic  bladder, correlation with urinalysis is recommended to rule out  underlying cystitis.  2. Nonobstructing renal calculus on the right measured 0.2 cm. No  hydronephrosis.  3. Additional chronic findings as described above.    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  Low-lying and stretched conus medullaris extending to the level of  the S1 vertebral body through the posterior sacral defect, consistent  with re-tethering.    Nurse Plan of Action:    ONLY    Isabella Gillespie RN  Pre-Admission Testing

## 2024-10-09 ENCOUNTER — DOCUMENTATION (OUTPATIENT)
Dept: UROLOGY | Facility: CLINIC | Age: 24
End: 2024-10-09
Payer: MEDICARE

## 2024-10-09 NOTE — PROGRESS NOTES
Pt asking for increase in catheter supplies , pt states because of the bladder stones , she caths every 2 hours or so . New order faxed to [facility] [phone] fax  for 240 catheters .

## 2024-10-10 ENCOUNTER — APPOINTMENT (OUTPATIENT)
Dept: PREADMISSION TESTING | Facility: HOSPITAL | Age: 24
End: 2024-10-10
Payer: MEDICARE

## 2024-10-15 DIAGNOSIS — N21.0 BLADDER CALCULUS: ICD-10-CM

## 2024-10-16 ENCOUNTER — APPOINTMENT (OUTPATIENT)
Dept: PREADMISSION TESTING | Facility: HOSPITAL | Age: 24
End: 2024-10-16
Payer: MEDICARE

## 2024-10-16 ENCOUNTER — PRE-ADMISSION TESTING (OUTPATIENT)
Dept: PREADMISSION TESTING | Facility: HOSPITAL | Age: 24
End: 2024-10-16
Payer: MEDICARE

## 2024-10-16 ENCOUNTER — DOCUMENTATION (OUTPATIENT)
Dept: UROLOGY | Facility: CLINIC | Age: 24
End: 2024-10-16
Payer: MEDICARE

## 2024-10-16 VITALS
OXYGEN SATURATION: 100 % | TEMPERATURE: 98.5 F | HEART RATE: 86 BPM | DIASTOLIC BLOOD PRESSURE: 77 MMHG | SYSTOLIC BLOOD PRESSURE: 109 MMHG | BODY MASS INDEX: 22.85 KG/M2 | RESPIRATION RATE: 20 BRPM | WEIGHT: 116.4 LBS | HEIGHT: 60 IN

## 2024-10-16 DIAGNOSIS — N31.9 NEUROGENIC BLADDER: ICD-10-CM

## 2024-10-16 DIAGNOSIS — N21.0 BLADDER CALCULUS: ICD-10-CM

## 2024-10-16 DIAGNOSIS — Z01.818 PREOPERATIVE CLEARANCE: Primary | ICD-10-CM

## 2024-10-16 LAB
ABO GROUP (TYPE) IN BLOOD: NORMAL
ANION GAP SERPL CALC-SCNC: 11 MMOL/L (ref 10–20)
ANTIBODY SCREEN: NORMAL
APPEARANCE UR: ABNORMAL
BILIRUB UR STRIP.AUTO-MCNC: NEGATIVE MG/DL
BUN SERPL-MCNC: 9 MG/DL (ref 6–23)
CALCIUM SERPL-MCNC: 10.4 MG/DL (ref 8.6–10.6)
CHLORIDE SERPL-SCNC: 108 MMOL/L (ref 98–107)
CO2 SERPL-SCNC: 21 MMOL/L (ref 21–32)
COLOR UR: COLORLESS
CREAT SERPL-MCNC: 0.47 MG/DL (ref 0.5–1.05)
EGFRCR SERPLBLD CKD-EPI 2021: >90 ML/MIN/1.73M*2
ERYTHROCYTE [DISTWIDTH] IN BLOOD BY AUTOMATED COUNT: 18.3 % (ref 11.5–14.5)
GLUCOSE SERPL-MCNC: 86 MG/DL (ref 74–99)
GLUCOSE UR STRIP.AUTO-MCNC: NORMAL MG/DL
HCT VFR BLD AUTO: 31.1 % (ref 36–46)
HGB BLD-MCNC: 9.1 G/DL (ref 12–16)
KETONES UR STRIP.AUTO-MCNC: NEGATIVE MG/DL
LEUKOCYTE ESTERASE UR QL STRIP.AUTO: ABNORMAL
MCH RBC QN AUTO: 20.3 PG (ref 26–34)
MCHC RBC AUTO-ENTMCNC: 29.3 G/DL (ref 32–36)
MCV RBC AUTO: 69 FL (ref 80–100)
MUCOUS THREADS #/AREA URNS AUTO: ABNORMAL /LPF
NITRITE UR QL STRIP.AUTO: ABNORMAL
NRBC BLD-RTO: 0 /100 WBCS (ref 0–0)
PH UR STRIP.AUTO: 6.5 [PH]
PLATELET # BLD AUTO: 427 X10*3/UL (ref 150–450)
POTASSIUM SERPL-SCNC: 4.2 MMOL/L (ref 3.5–5.3)
PROT UR STRIP.AUTO-MCNC: NEGATIVE MG/DL
RBC # BLD AUTO: 4.49 X10*6/UL (ref 4–5.2)
RBC # UR STRIP.AUTO: NEGATIVE /UL
RBC #/AREA URNS AUTO: ABNORMAL /HPF
RH FACTOR (ANTIGEN D): NORMAL
SODIUM SERPL-SCNC: 136 MMOL/L (ref 136–145)
SP GR UR STRIP.AUTO: 1.01
SQUAMOUS #/AREA URNS AUTO: ABNORMAL /HPF
UROBILINOGEN UR STRIP.AUTO-MCNC: NORMAL MG/DL
WBC # BLD AUTO: 7 X10*3/UL (ref 4.4–11.3)
WBC #/AREA URNS AUTO: ABNORMAL /HPF

## 2024-10-16 PROCEDURE — 36415 COLL VENOUS BLD VENIPUNCTURE: CPT

## 2024-10-16 PROCEDURE — 80048 BASIC METABOLIC PNL TOTAL CA: CPT

## 2024-10-16 PROCEDURE — 99406 BEHAV CHNG SMOKING 3-10 MIN: CPT | Performed by: NURSE ANESTHETIST, CERTIFIED REGISTERED

## 2024-10-16 PROCEDURE — 87077 CULTURE AEROBIC IDENTIFY: CPT

## 2024-10-16 PROCEDURE — 86901 BLOOD TYPING SEROLOGIC RH(D): CPT

## 2024-10-16 PROCEDURE — 99204 OFFICE O/P NEW MOD 45 MIN: CPT | Performed by: NURSE ANESTHETIST, CERTIFIED REGISTERED

## 2024-10-16 PROCEDURE — 85027 COMPLETE CBC AUTOMATED: CPT

## 2024-10-16 PROCEDURE — 87081 CULTURE SCREEN ONLY: CPT | Mod: 59

## 2024-10-16 PROCEDURE — 81001 URINALYSIS AUTO W/SCOPE: CPT

## 2024-10-16 RX ORDER — CHLORHEXIDINE GLUCONATE ORAL RINSE 1.2 MG/ML
15 SOLUTION DENTAL AS NEEDED
Qty: 473 ML | Refills: 0 | Status: SHIPPED | OUTPATIENT
Start: 2024-10-16 | End: 2024-10-24 | Stop reason: HOSPADM

## 2024-10-16 RX ORDER — CHLORHEXIDINE GLUCONATE 40 MG/ML
SOLUTION TOPICAL DAILY PRN
Qty: 473 ML | Refills: 0 | Status: SHIPPED | OUTPATIENT
Start: 2024-10-16 | End: 2024-10-24 | Stop reason: HOSPADM

## 2024-10-16 ASSESSMENT — DUKE ACTIVITY SCORE INDEX (DASI)
CAN YOU DO LIGHT WORK AROUND THE HOUSE LIKE DUSTING OR WASHING DISHES: YES
CAN YOU CLIMB A FLIGHT OF STAIRS OR WALK UP A HILL: YES
CAN YOU DO MODERATE WORK AROUND THE HOUSE LIKE VACUUMING, SWEEPING FLOORS OR CARRYING GROCERIES: YES
DASI METS SCORE: 6.8
CAN YOU DO YARD WORK LIKE RAKING LEAVES, WEEDING OR PUSHING A MOWER: NO
CAN YOU WALK INDOORS, SUCH AS AROUND YOUR HOUSE: YES
CAN YOU TAKE CARE OF YOURSELF (EAT, DRESS, BATHE, OR USE TOILET): YES
CAN YOU DO HEAVY WORK AROUND THE HOUSE LIKE SCRUBBING FLOORS OR LIFTING AND MOVING HEAVY FURNITURE: NO
CAN YOU PARTICIPATE IN MODERATE RECREATIONAL ACTIVITIES LIKE GOLF, BOWLING, DANCING, DOUBLES TENNIS OR THROWING A BASEBALL OR FOOTBALL: YES
TOTAL_SCORE: 32.95
CAN YOU WALK A BLOCK OR TWO ON LEVEL GROUND: YES
CAN YOU RUN A SHORT DISTANCE: YES
CAN YOU HAVE SEXUAL RELATIONS: NO
CAN YOU PARTICIPATE IN STRENOUS SPORTS LIKE SWIMMING, SINGLES TENNIS, FOOTBALL, BASKETBALL, OR SKIING: NO

## 2024-10-16 ASSESSMENT — ENCOUNTER SYMPTOMS
CONSTITUTIONAL NEGATIVE: 1
MUSCULOSKELETAL NEGATIVE: 1
CARDIOVASCULAR NEGATIVE: 1
RESPIRATORY NEGATIVE: 1
EYES NEGATIVE: 1
GASTROINTESTINAL NEGATIVE: 1
ENDOCRINE NEGATIVE: 1
NECK NEGATIVE: 1

## 2024-10-16 ASSESSMENT — ACTIVITIES OF DAILY LIVING (ADL): ADL_SCORE: 0

## 2024-10-16 ASSESSMENT — LIFESTYLE VARIABLES: SMOKING_STATUS: NONSMOKER

## 2024-10-16 NOTE — PROGRESS NOTES
Office note with dated addendum with dx of retention  faxed to [facility] [phone] for catheter supplies

## 2024-10-16 NOTE — CPM/PAT H&P
CPM/PAT Evaluation       Name: Slade MOE Murtaza (Slade Hauser)  /Age:  y.o.     Visit Type:   In-Person         Past Medical History:   Diagnosis Date    Bladder stones     Closed lumbar spina bifida with hydrocephalus (Multi)     Headache, unspecified 2019    Frequent headaches    Myelomeningocele with hydrocephalus (Multi)     .lumbarmyelomeningocele repaired at birth, no re-tethering.    Neurogenic bladder     s/p bladder augment with Dr. Marrufo    Other symptoms and signs involving the musculoskeletal system 2019    Limb weakness    Personal history of diseases of the blood and blood-forming organs and certain disorders involving the immune mechanism     History of anemia    Personal history of other diseases of urinary system     History of bladder problems    Presence of cerebrospinal fluid drainage device 2021     (ventriculoperitoneal) shunt status    Urinary tract infection     recurring UTI's       Past Surgical History:   Procedure Laterality Date    BLADDER SURGERY  2013    Bladder Surgery    CYSTOURETHROSCOPY      OTHER SURGICAL HISTORY  2013    Cranial Tap Of Shunt Tubing    OTHER SURGICAL HISTORY  2016    Bladder Enterocystoplasty    VENTRICULOPERITONEAL SHUNT         Patient  has no history on file for sexual activity.    No family history on file.    Allergies   Allergen Reactions    Animal Dander Unknown    House Dust Unknown    Latex Unknown    Pollen Extracts Unknown       Prior to Admission medications    Medication Sig Start Date End Date Taking? Authorizing Provider   chlorhexidine (Hibiclens) 4 % external liquid Apply topically once daily as needed (Shower with for 3 days prior to surgery, and morning of surgery) for up to 3 days. 10/16/24 10/19/24  YONY Cedillo-CRNA   chlorhexidine (Peridex) 0.12 % solution Use 15 mL in the mouth or throat if needed (after brushing teeth, rinse mouth with mouthwash for 30 seconds, swish and spit.   Do not swallow) for up to 2 days. 10/16/24 10/18/24  Tania AILEEN Sol, APRN-CRNA   sodium chloride 0.9 % irrigation solution IRRIGATE WITH 60 ML AS DIRECTED 2 TIMES A DAY. 9/27/24   Moose Chun MD   syringe, disposable, 60 mL syringe 60 mL 2 times a day. 9/9/24   Moose Chun MD        PAT ROS:   Constitutional:   neg    Neuro/Psych:    Hx of spina bifida and  shunt  Eyes:   neg    Ears:   neg    Nose:   neg    Mouth:   neg    Throat:   neg    Neck:   neg    Cardio:   neg    Respiratory:   neg    Endocrine:   neg    GI:   neg    :    Pt suspects current UTI  Musculoskeletal:   neg    Hematologic:   neg    Skin:  neg        Physical Exam  Vitals and nursing note reviewed.   Constitutional:       General: She is awake.      Appearance: Normal appearance. She is normal weight.   HENT:      Head: Normocephalic and atraumatic.   Eyes:      General: Lids are normal.      Conjunctiva/sclera: Conjunctivae normal.   Neck:      Trachea: Trachea normal.   Cardiovascular:      Rate and Rhythm: Normal rate and regular rhythm.      Pulses: Normal pulses.      Heart sounds: Normal heart sounds.   Pulmonary:      Effort: Pulmonary effort is normal.      Breath sounds: Normal breath sounds and air entry.   Abdominal:      General: Abdomen is protuberant.      Palpations: Abdomen is soft.   Musculoskeletal:      Cervical back: Full passive range of motion without pain and normal range of motion.      Right lower leg: No edema.      Left lower leg: No edema.   Skin:     General: Skin is warm and dry.      Capillary Refill: Capillary refill takes less than 2 seconds.   Neurological:      General: No focal deficit present.      Mental Status: She is alert and oriented to person, place, and time.      GCS: GCS eye subscore is 4. GCS verbal subscore is 5. GCS motor subscore is 6.      Cranial Nerves: Cranial nerves 2-12 are intact.      Sensory: Sensation is intact.      Motor: Motor function is intact.      Coordination:  Coordination is intact.      Gait: Gait is intact.      Comments:  shunt placed as baby, present on Right side.  Hx spina bifida required surgery   Psychiatric:         Attention and Perception: Attention and perception normal.         Mood and Affect: Mood and affect normal.         Speech: Speech normal.         Behavior: Behavior normal. Behavior is cooperative.         Thought Content: Thought content normal.         Cognition and Memory: Cognition and memory normal.         Judgment: Judgment normal.          PAT AIRWAY:   Airway:     Mallampati::  I    TM distance::  >3 FB    Neck ROM::  Full  normal        Visit Vitals  /77   Pulse 86   Temp 36.9 °C (98.5 °F)   Resp 20   Sat 100%    DASI Risk Score      Flowsheet Row Pre-Admission Testing [facility]/16/2024 in [facility]   Can you take care of yourself (eat, dress, bathe, or use toilet)?  2.75 filed at 10/16/2024 1044   Can you walk indoors, such as around your house? 1.75 filed at 10/16/2024 1044   Can you walk a block or two on level ground?  2.75 filed at 10/16/2024 1044   Can you climb a flight of stairs or walk up a hill? 5.5 filed at 10/16/2024 1044   Can you run a short distance? 8 filed at 10/16/2024 1044   Can you do light work around the house like dusting or washing dishes? 2.7 filed at 10/16/2024 1044   Can you do moderate work around the house like vacuuming, sweeping floors or carrying groceries? 3.5 filed at 10/16/2024 1044   Can you do heavy work around the house like scrubbing floors or lifting and moving heavy furniture?  0 filed at 10/16/2024 1044   Can you do yard work like raking leaves, weeding or pushing a mower? 0 filed at 10/16/2024 1044   Can you have sexual relations? 0 filed at 10/16/2024 1044   Can you participate in moderate recreational activities like golf, bowling, dancing, doubles tennis or throwing a baseball or football? 6 filed at 10/16/2024 1044   Can you participate in strenous sports like swimming,  singles tennis, football, basketball, or skiing? 0 filed at 10/16/2024 1044   DASI SCORE 32.95 filed at 10/16/2024 1044   METS Score (Will be calculated only when all the questions are answered) 6.8 filed at 10/16/2024 1044          Capdanai DVT Assessment      Flowsheet Row Pre-Admission Testing from 10/16/2024 in [facility]   DVT Score 4 filed at 10/16/2024 1115   Surgical Factors Major surgery planned, lasting 2-3 hours filed at 10/16/2024 1115   BMI 30 or less filed at 10/16/2024 1115          Modified Frailty Index      Flowsheet Row Pre-Admission Testing from 10/16/2024 in [facility]   Non-independent functional status (problems with dressing, bathing, personal grooming, or cooking) 0 filed at 10/16/2024 1117   History of diabetes mellitus  0 filed at 10/16/2024 1117   History of COPD 0 filed at 10/16/2024 1117   History of CHF No filed at 10/16/2024 1117   History of MI 0 filed at 10/16/2024 1117   History of Percutaneous Coronary Intervention, Cardiac Surgery, or Angina No filed at 10/16/2024 1117   Hypertension requiring the use of medication  0 filed at 10/16/2024 1117   Peripheral vascular disease 0 filed at 10/16/2024 1117   Impaired sensorium (cognitive impairement or loss, clouding, or delirium) 0 filed at 10/16/2024 1117   TIA or CVA withouy residual deficit 0 filed at 10/16/2024 1117   Cerebrovascular accident with deficit 0 filed at 10/16/2024 1117   Modified Frailty Index Calculator 0 filed at 10/16/2024 1117          CHADS2 Stroke Risk  Current as of yesterday        N/A 3 to 100%: High Risk   2 to < 3%: Medium Risk   0 to < 2%: Low Risk     Last Change: N/A          This score determines the patient's risk of having a stroke if the patient has atrial fibrillation.        This score is not applicable to this patient. Components are not calculated.          Revised Cardiac Risk Index      Flowsheet Row Pre-Admission Testing from 10/16/2024 in [facility]  [place]   High-Risk Surgery (Intraperitoneal, Intrathoracic,Suprainguinal vascular) 1 filed at 10/16/2024 1116   History of ischemic heart disease (History of MI, History of positive exercuse test, Current chest paint considered due to myocardial ischemia, Use of nitrate therapy, ECG with pathological Q Waves) 0 filed at 10/16/2024 1116   History of congestive heart failure (pulmonary edemia, bilateral rales or S3 gallop, Paroxysmal nocturnal dyspnea, CXR showing pulmonary vascular redistribution) 0 filed at 10/16/2024 1116   History of cerebrovascular disease (Prior TIA or stroke) 0 filed at 10/16/2024 1116   Pre-operative insulin treatment 0 filed at 10/16/2024 1116   Pre-operative creatinine>2 mg/dl 0 filed at 10/16/2024 1116   Revised Cardiac Risk Calculator 1 filed at 10/16/2024 1116          Apfel Simplified Score      Flowsheet Row Pre-Admission Testing [facility] 10/16/2024 in [facility]   Smoking status 1 filed at 10/16/2024 1117   History of motion sickness or PONV  0 filed at 10/16/2024 1117   Use of postoperative opioids 1 filed at 10/16/2024 1117   Gender - Female 1=Yes filed at 10/16/2024 1117   Apfel Simplified Score Calculator 3 filed at 10/16/2024 1117          Risk Analysis Index Results This Encounter         10/16/2024  1117             Do you live in a place other than your own home?: 0    Any kidney failure, kidney not working well, or seeing a kidney doctor (nephrologist)? If yes, was this for kidney stones or another problem?: 8 Both kidney stones and other problems    Any history of chronic (long-term) congestive heart failure (CHF)?: 0 No    Any shortness of breath when resting?: 0 No    In the past five years, have you been diagnosed with or treated for cancer?: No    During the last 3 months has it become difficult for you to remember things or organize your thoughts?: 0 No    Have you lost weight of 10 pounds or more in the past 3 months without trying?: 0 No    Do you have any  loss of appetitie?: 0 No    Getting Around (Mobility): 0 Can get around without help    Eatin Can plan and prepare own meals    Toiletin Can use toilet without any help    Personal Hygiene (Bathing, Hand Washing, Changing Clothes): 0 Can shower or bathe without any help    RILEY Cancer History: Patient does not indicate history of cancer    Total Risk Analysis Index Score Without Cancer: 12    Total Risk Analysis Index Score: 12          Stop Bang Score      Flowsheet Row Pre-Admission Testing from 10/16/2024 in [facility]   Do you snore loudly? 0 filed at 10/16/2024 1044   Do you often feel tired or fatigued after your sleep? 0 filed at 10/16/2024 1044   Has anyone ever observed you stop breathing in your sleep? 0 filed at 10/16/2024 104   Do you have or are you being treated for high blood pressure? 0 filed at 10/16/2024 1044   Recent BMI (Calculated) 22.7 filed at 10/16/2024 1044   Is BMI greater than 35 kg/m2? 0=No filed at 10/16/2024 1044   Age older than 50 years old? 0=No filed at 10/16/2024 1044   Is your neck circumference greater than 17 inches (Male) or 16 inches (Female)? 0 filed at 10/16/2024 1044   Gender - Male 0=No filed at 10/16/2024 1044   STOP-BANG Total Score 0 filed at 10/16/2024 1044            No results found for this or any previous visit (from the past 24 hours).     Assessment and Plan:    24 y.o.  female  scheduled for open cystolithotomy on 10/24/24 with Dr. Chun for neurogenic bladder and chronic UTIs.  PMHX includes spina bifida (surgery as an infant),  shunt, recurrent UTIs, bladder stones, and neurogenic bladder.  Presents to CPM today for perioperative risk stratification and optimization. Pt denies smoking but does admit to occassional vaping.    Neuro:  Pt with PMH of spina bifida (with correction) and  shunt.  This places the patient at an increased risk for perioperative neurological complications due to this and the following:  type and duration  of surgery, Patient instructed on and provided cognitive exercises  Patient is not at increased risk for perioperative CVA    HEENT:  No HEENT diagnosis or significant findings on chart review or clinical presentation and evaluation. No further preoperative testing/intervention indicated at this time.    Cardiovascular:  No CV diagnosis or significant findings on chart review or clinical presentation and evaluation. No further preoperative testing or intervention is indicated at this time.  METS: 6.8  RCRI: 1 point, 6.0% risk for postoperative MACE   SOPHIA: 0.0% risk for 30 day postoperative MACE    Pulmonary:  No pulmonary diagnosis, however patient is at increased risk of perioperative complications secondary to  Tobacco abuse, site of surgery, major surgery, duration of surgery > 2 hours, types of anesthetic  Stop Bang score is 0 placing patient at low risk for KATHIE  ARISCAT: 26-44 points, 13.3% risk of in-hospital postoperative pulmonary complication  PRODIGY: Low risk for opioid induced respiratory depression  Smoking cessation discussed with patient, patient also provided cessation education/hotline handout, Pumonary toilet education discussed, patient also provided deep breathing exercises and incentive spirometry educational handout    Renal:   No renal diagnosis, however patient is at increase risk for perioperative renal complications secondary to  intraperitoneal surgery and chronic UTIs, bladder stones and neurogenic bladder.  Pt at Moderate risk for perioperative TANIA based on Dynamic Predictive Scoring Tool for Perioperative TANIA     Endocrine:  No endocrine diagnosis or significant findings on chart review or clinical presentation and evaluation. No further testing or intervention is indicated at this time.    Hematologic:  No hematologic diagnosis or significant findings on chart review or clinical presentation and evaluation. No further testing or intervention is indicated at this time.   Caprini Score  4, patient at Low risk for perioperative DVT.  Patient provided with VTE education/handout. Pt with hx of anemia.  Type and screen collected.  Pt consents to blood and blood products if needed.    Gastrointestinal:   No GI diagnosis or significant findings on chart review or clinical presentation and evaluation.   Eat-10 score 0  Apfel 3    Infectious disease:   No infectious diagnosis or significant findings on chart review or clinical presentation and evaluation.   Prescription provided for CHG body wash and dental rinse. CHG use instructions reviewed and provided to patient.  Staph screen collected    Musculoskeletal:   No diagnosis or significant findings on chart review or clinical presentation and evaluation.     Anesthesia/Airway:  No anesthesia complications      Medication instructions and NPO guidelines reviewed with the patient.  All questions or concerns discussed and addressed.

## 2024-10-16 NOTE — H&P (VIEW-ONLY)
CPM/PAT Evaluation       Name: Slade MOE Murtaza (Slade Hauser)  /Age:  y.o.     Visit Type:   In-Person         Past Medical History:   Diagnosis Date    Bladder stones     Closed lumbar spina bifida with hydrocephalus (Multi)     Headache, unspecified 2019    Frequent headaches    Myelomeningocele with hydrocephalus (Multi)     .lumbarmyelomeningocele repaired at birth, no re-tethering.    Neurogenic bladder     s/p bladder augment with Dr. Marrufo    Other symptoms and signs involving the musculoskeletal system 2019    Limb weakness    Personal history of diseases of the blood and blood-forming organs and certain disorders involving the immune mechanism     History of anemia    Personal history of other diseases of urinary system     History of bladder problems    Presence of cerebrospinal fluid drainage device 2021     (ventriculoperitoneal) shunt status    Urinary tract infection     recurring UTI's       Past Surgical History:   Procedure Laterality Date    BLADDER SURGERY  2013    Bladder Surgery    CYSTOURETHROSCOPY      OTHER SURGICAL HISTORY  2013    Cranial Tap Of Shunt Tubing    OTHER SURGICAL HISTORY  2016    Bladder Enterocystoplasty    VENTRICULOPERITONEAL SHUNT         Patient  has no history on file for sexual activity.    No family history on file.    Allergies   Allergen Reactions    Animal Dander Unknown    House Dust Unknown    Latex Unknown    Pollen Extracts Unknown       Prior to Admission medications    Medication Sig Start Date End Date Taking? Authorizing Provider   chlorhexidine (Hibiclens) 4 % external liquid Apply topically once daily as needed (Shower with for 3 days prior to surgery, and morning of surgery) for up to 3 days. 10/16/24 10/19/24  YONY Cedillo-CRNA   chlorhexidine (Peridex) 0.12 % solution Use 15 mL in the mouth or throat if needed (after brushing teeth, rinse mouth with mouthwash for 30 seconds, swish and spit.   Do not swallow) for up to 2 days. 10/16/24 10/18/24  Tania AILEEN Sol, APRN-CRNA   sodium chloride 0.9 % irrigation solution IRRIGATE WITH 60 ML AS DIRECTED 2 TIMES A DAY. 9/27/24   Moose Chun MD   syringe, disposable, 60 mL syringe 60 mL 2 times a day. 9/9/24   Moose Chun MD        PAT ROS:   Constitutional:   neg    Neuro/Psych:    Hx of spina bifida and  shunt  Eyes:   neg    Ears:   neg    Nose:   neg    Mouth:   neg    Throat:   neg    Neck:   neg    Cardio:   neg    Respiratory:   neg    Endocrine:   neg    GI:   neg    :    Pt suspects current UTI  Musculoskeletal:   neg    Hematologic:   neg    Skin:  neg        Physical Exam  Vitals and nursing note reviewed.   Constitutional:       General: She is awake.      Appearance: Normal appearance. She is normal weight.   HENT:      Head: Normocephalic and atraumatic.   Eyes:      General: Lids are normal.      Conjunctiva/sclera: Conjunctivae normal.   Neck:      Trachea: Trachea normal.   Cardiovascular:      Rate and Rhythm: Normal rate and regular rhythm.      Pulses: Normal pulses.      Heart sounds: Normal heart sounds.   Pulmonary:      Effort: Pulmonary effort is normal.      Breath sounds: Normal breath sounds and air entry.   Abdominal:      General: Abdomen is protuberant.      Palpations: Abdomen is soft.   Musculoskeletal:      Cervical back: Full passive range of motion without pain and normal range of motion.      Right lower leg: No edema.      Left lower leg: No edema.   Skin:     General: Skin is warm and dry.      Capillary Refill: Capillary refill takes less than 2 seconds.   Neurological:      General: No focal deficit present.      Mental Status: She is alert and oriented to person, place, and time.      GCS: GCS eye subscore is 4. GCS verbal subscore is 5. GCS motor subscore is 6.      Cranial Nerves: Cranial nerves 2-12 are intact.      Sensory: Sensation is intact.      Motor: Motor function is intact.      Coordination:  Coordination is intact.      Gait: Gait is intact.      Comments:  shunt placed as baby, present on Right side.  Hx spina bifida required surgery   Psychiatric:         Attention and Perception: Attention and perception normal.         Mood and Affect: Mood and affect normal.         Speech: Speech normal.         Behavior: Behavior normal. Behavior is cooperative.         Thought Content: Thought content normal.         Cognition and Memory: Cognition and memory normal.         Judgment: Judgment normal.          PAT AIRWAY:   Airway:     Mallampati::  I    TM distance::  >3 FB    Neck ROM::  Full  normal        Visit Vitals  /77   Pulse 86   Temp 36.9 °C (98.5 °F)   Resp 20   Sat 100%    DASI Risk Score      Flowsheet Row Pre-Admission Testing from 10/16/2024 in [facility]   Can you take care of yourself (eat, dress, bathe, or use toilet)?  2.75 filed at 10/16/2024 1044   Can you walk indoors, such as around your house? 1.75 filed at 10/16/2024 1044   Can you walk a block or two on level ground?  2.75 filed at 10/16/2024 1044   Can you climb a flight of stairs or walk up a hill? 5.5 filed at 10/16/2024 1044   Can you run a short distance? 8 filed at 10/16/2024 1044   Can you do light work around the house like dusting or washing dishes? 2.7 filed at 10/16/2024 1044   Can you do moderate work around the house like vacuuming, sweeping floors or carrying groceries? 3.5 filed at 10/16/2024 1044   Can you do heavy work around the house like scrubbing floors or lifting and moving heavy furniture?  0 filed at 10/16/2024 1044   Can you do yard work like raking leaves, weeding or pushing a mower? 0 filed at 10/16/2024 1044   Can you have sexual relations? 0 filed at 10/16/2024 1044   Can you participate in moderate recreational activities like golf, bowling, dancing, doubles tennis or throwing a baseball or football? 6 filed at 10/16/2024 1044   Can you participate in strenous sports like swimming,  singles tennis, football, basketball, or skiing? 0 filed at 10/16/2024 1044   DASI SCORE 32.95 filed at 10/16/2024 1044   METS Score (Will be calculated only when all the questions are answered) 6.8 filed at 10/16/2024 1044          Capdanai DVT Assessment      Flowsheet Row Pre-Admission Testing from 10/16/2024 in [facility]   DVT Score 4 filed at 10/16/2024 1115   Surgical Factors Major surgery planned, lasting 2-3 hours filed at 10/16/2024 1115   BMI 30 or less filed at 10/16/2024 1115          Modified Frailty Index      Flowsheet Row Pre-Admission Testing from 10/16/2024 in [facility]   Non-independent functional status (problems with dressing, bathing, personal grooming, or cooking) 0 filed at 10/16/2024 1117   History of diabetes mellitus  0 filed at 10/16/2024 1117   History of COPD 0 filed at 10/16/2024 1117   History of CHF No filed at 10/16/2024 1117   History of MI 0 filed at 10/16/2024 1117   History of Percutaneous Coronary Intervention, Cardiac Surgery, or Angina No filed at 10/16/2024 1117   Hypertension requiring the use of medication  0 filed at 10/16/2024 1117   Peripheral vascular disease 0 filed at 10/16/2024 1117   Impaired sensorium (cognitive impairement or loss, clouding, or delirium) 0 filed at 10/16/2024 1117   TIA or CVA withouy residual deficit 0 filed at 10/16/2024 1117   Cerebrovascular accident with deficit 0 filed at 10/16/2024 1117   Modified Frailty Index Calculator 0 filed at 10/16/2024 1117          CHADS2 Stroke Risk  Current as of yesterday        N/A 3 to 100%: High Risk   2 to < 3%: Medium Risk   0 to < 2%: Low Risk     Last Change: N/A          This score determines the patient's risk of having a stroke if the patient has atrial fibrillation.        This score is not applicable to this patient. Components are not calculated.          Revised Cardiac Risk Index      Flowsheet Row Pre-Admission Testing from 10/16/2024 in [facility]  [place]   High-Risk Surgery (Intraperitoneal, Intrathoracic,Suprainguinal vascular) 1 filed at 10/16/2024 1116   History of ischemic heart disease (History of MI, History of positive exercuse test, Current chest paint considered due to myocardial ischemia, Use of nitrate therapy, ECG with pathological Q Waves) 0 filed at 10/16/2024 1116   History of congestive heart failure (pulmonary edemia, bilateral rales or S3 gallop, Paroxysmal nocturnal dyspnea, CXR showing pulmonary vascular redistribution) 0 filed at 10/16/2024 1116   History of cerebrovascular disease (Prior TIA or stroke) 0 filed at 10/16/2024 1116   Pre-operative insulin treatment 0 filed at 10/16/2024 1116   Pre-operative creatinine>2 mg/dl 0 filed at 10/16/2024 1116   Revised Cardiac Risk Calculator 1 filed at 10/16/2024 1116          Apfel Simplified Score      Flowsheet Row Pre-Admission Testing from 10/16/2024 in [facility]   Smoking status 1 filed at 10/16/2024 1117   History of motion sickness or PONV  0 filed at 10/16/2024 1117   Use of postoperative opioids 1 filed at 10/16/2024 1117   Gender - Female 1=Yes filed at 10/16/2024 1117   Apfel Simplified Score Calculator 3 filed at 10/16/2024 1117          Risk Analysis Index Results This Encounter         10/16/2024  1117             Do you live in a place other than your own home?: 0    Any kidney failure, kidney not working well, or seeing a kidney doctor (nephrologist)? If yes, was this for kidney stones or another problem?: 8 Both kidney stones and other problems    Any history of chronic (long-term) congestive heart failure (CHF)?: 0 No    Any shortness of breath when resting?: 0 No    In the past five years, have you been diagnosed with or treated for cancer?: No    During the last 3 months has it become difficult for you to remember things or organize your thoughts?: 0 No    Have you lost weight of 10 pounds or more in the past 3 months without trying?: 0 No    Do you have any  loss of appetitie?: 0 No    Getting Around (Mobility): 0 Can get around without help    Eatin Can plan and prepare own meals    Toiletin Can use toilet without any help    Personal Hygiene (Bathing, Hand Washing, Changing Clothes): 0 Can shower or bathe without any help    RILEY Cancer History: Patient does not indicate history of cancer    Total Risk Analysis Index Score Without Cancer: 12    Total Risk Analysis Index Score: 12          Stop Bang Score      Flowsheet Row Pre-Admission Testing from 10/16/2024 in [facility]   Do you snore loudly? 0 filed at 10/16/2024 1044   Do you often feel tired or fatigued after your sleep? 0 filed at 10/16/2024 1044   Has anyone ever observed you stop breathing in your sleep? 0 filed at 10/16/2024 104   Do you have or are you being treated for high blood pressure? 0 filed at 10/16/2024 1044   Recent BMI (Calculated) 22.7 filed at 10/16/2024 1044   Is BMI greater than 35 kg/m2? 0=No filed at 10/16/2024 1044   Age older than 50 years old? 0=No filed at 10/16/2024 1044   Is your neck circumference greater than 17 inches (Male) or 16 inches (Female)? 0 filed at 10/16/2024 1044   Gender - Male 0=No filed at 10/16/2024 1044   STOP-BANG Total Score 0 filed at 10/16/2024 1044            No results found for this or any previous visit (from the past 24 hours).     Assessment and Plan:    24 y.o.  female  scheduled for open cystolithotomy on 10/24/24 with Dr. Chun for neurogenic bladder and chronic UTIs.  PMHX includes spina bifida (surgery as an infant),  shunt, recurrent UTIs, bladder stones, and neurogenic bladder.  Presents to CPM today for perioperative risk stratification and optimization. Pt denies smoking but does admit to occassional vaping.    Neuro:  Pt with PMH of spina bifida (with correction) and  shunt.  This places the patient at an increased risk for perioperative neurological complications due to this and the following:  type and duration  of surgery, Patient instructed on and provided cognitive exercises  Patient is not at increased risk for perioperative CVA    HEENT:  No HEENT diagnosis or significant findings on chart review or clinical presentation and evaluation. No further preoperative testing/intervention indicated at this time.    Cardiovascular:  No CV diagnosis or significant findings on chart review or clinical presentation and evaluation. No further preoperative testing or intervention is indicated at this time.  METS: 6.8  RCRI: 1 point, 6.0% risk for postoperative MACE   SOPHIA: 0.0% risk for 30 day postoperative MACE    Pulmonary:  No pulmonary diagnosis, however patient is at increased risk of perioperative complications secondary to  Tobacco abuse, site of surgery, major surgery, duration of surgery > 2 hours, types of anesthetic  Stop Bang score is 0 placing patient at low risk for KATHIE  ARISCAT: 26-44 points, 13.3% risk of in-hospital postoperative pulmonary complication  PRODIGY: Low risk for opioid induced respiratory depression  Smoking cessation discussed with patient, patient also provided cessation education/hotline handout, Pumonary toilet education discussed, patient also provided deep breathing exercises and incentive spirometry educational handout    Renal:   No renal diagnosis, however patient is at increase risk for perioperative renal complications secondary to  intraperitoneal surgery and chronic UTIs, bladder stones and neurogenic bladder.  Pt at Moderate risk for perioperative TANIA based on Dynamic Predictive Scoring Tool for Perioperative TANIA     Endocrine:  No endocrine diagnosis or significant findings on chart review or clinical presentation and evaluation. No further testing or intervention is indicated at this time.    Hematologic:  No hematologic diagnosis or significant findings on chart review or clinical presentation and evaluation. No further testing or intervention is indicated at this time.   Caprini Score  4, patient at Low risk for perioperative DVT.  Patient provided with VTE education/handout. Pt with hx of anemia.  Type and screen collected.  Pt consents to blood and blood products if needed.    Gastrointestinal:   No GI diagnosis or significant findings on chart review or clinical presentation and evaluation.   Eat-10 score 0  Apfel 3    Infectious disease:   No infectious diagnosis or significant findings on chart review or clinical presentation and evaluation.   Prescription provided for CHG body wash and dental rinse. CHG use instructions reviewed and provided to patient.  Staph screen collected    Musculoskeletal:   No diagnosis or significant findings on chart review or clinical presentation and evaluation.     Anesthesia/Airway:  No anesthesia complications      Medication instructions and NPO guidelines reviewed with the patient.  All questions or concerns discussed and addressed.

## 2024-10-16 NOTE — PREPROCEDURE INSTRUCTIONS
Thank you for visiting [facility] ([facility]) today for your pre-procedure evaluation.    This summary includes instructions and information to aid you during your perioperative period.  Please read carefully. If you have any questions about your visit today, please call the number listed above.  If you become ill or have any changes to your health before your surgery, please contact your primary care provider and alert your surgeon.    Preparing for your Surgery       Exercises  Preoperative Deep Breathing Exercises  Why it is important to do deep breathing exercises before my surgery?  Deep breathing exercises strengthen your breathing muscles.  This helps you to recover after your surgery and decreases the chance of breathing complications.  How are the deep breathing exercises done?  Sit straight with your back supported.  Breathe in deeply and slowly through your nose. Your lower rib cage should expand and your abdomen may move forward.  Hold that breath for 3 to 5 seconds.  Breathe out through pursed lips, slowly and completely.  Rest and repeat 10 times every hour while awake.  Rest longer if you become dizzy or lightheaded.       Incentive Spirometer   You were provided with an incentive spirometer in [facility], please follow the below instructions.   You were not provided an incentive spirometer in [facility], please disregard the incentive spirometer instructions  What is an incentive spirometer?  An incentive spirometer is a device used before and after surgery to “exercise” your lungs.  It helps you to take deeper breaths to expand your lungs.  Below is an example of a basic incentive spirometer.  The device you receive may differ slightly but they all function the same.    Why do I need to use an incentive spirometer?  Using your incentive spirometer prepares your lungs for surgery and helps prevent lung problems after surgery.  How do I use my incentive spirometer?  When you're using  your incentive spirometer, make sure to breathe through your mouth. If you breathe through your nose, the incentive spirometer won't work properly. You can hold your nose if you have trouble.  If you feel dizzy at any time, stop and rest. Try again at a later time.  Follow the steps below:  Set up your incentive spirometer, expand the flexible tubing and connect to the outlet.  Sit upright in a chair or bed. Hold the incentive spirometer at eye level.   Put the mouthpiece in your mouth and close your lips tightly around it. Slowly breathe out (exhale) completely.  Breathe in (inhale) slowly through your mouth as deeply as you can. As you take a breath, you will see the piston rise inside the large column. While the piston rises, the indicator should move upwards. It should stay in between the 2 arrows (see Figure).  Try to get the piston as high as you can, while keeping the indicator between the arrows.   If the indicator doesn't stay between the arrows, you're breathing either too fast or too slow.  When you get it as high as you can, hold your breath for 10 seconds, or as long as possible. While you're holding your breath, the piston will slowly fall to the base of the spirometer.  Once the piston reaches the bottom of the spirometer, breathe out slowly through your mouth. Rest for a few seconds.  Repeat 10 times. Try to get the piston to the same level with each breath.  Repeat every hour while awake  You can carefully clean the outside of the mouthpiece with an alcohol wipe or soap and water.      Preoperative Brain Exercises    What are brain exercises?  A brain exercise is any activity that engages your thinking (cognitive) skills.    What types of activities are considered brain exercises?  Jigsaw puzzles, crossword puzzles, word jumble, memory games, word search, and many more.  Many can be found free online or on your phone via a mobile janie.    Why should I do brain exercises before my surgery?  More  recent research has shown brain exercise before surgery can lower the risk of postoperative delirium (confusion) which can be especially important for older adults.  Patients who did brain exercises for 5 to 10 hours the days before surgery, cut their risk of postoperative delirium in half up to 1 week after surgery.    Sit-to-Stand Exercise    What is the sit-to-stand exercise?  The sit-to-stand exercise strengthens the muscles of your lower body and muscles in the center of your body (core muscles for stability) helping to maintain and improve your strength and mobility.  How do I do the sit-to-stand exercise?  The goal is to do this exercise without using your arms or hands.  If this is too difficult, use your arms and hands or a chair with armrests to help slowly push yourself to the standing position and lower yourself back to the sitting position. As the movement becomes easier use your arms and hands less.    Steps to the sit-to-stand exercise  Sit up tall in a sturdy chair, knees bent, feet flat on the floor shoulder-width apart.  Shift your hips/pelvis forward in the chair to correctly position yourself for the next movement.  Lean forward at your hips.  Stand up straight putting equal weight on both feet.  Check to be sure you are properly aligned with the chair, in a slow controlled movement sit back down.  Repeat this exercise 10-15 times.  If needed you can do it fewer times until your strength improves.  Rest for 1 minute.  Do another 10-15 sit-to-stand exercises.  Try to do this in the morning and evening.        Instructions    Preoperative Fasting Guidelines    Why must I stop eating and drinking near surgery time?  With sedation, food or liquid in your stomach can enter your lungs causing serious complications  Food can increase nausea and vomiting  When do I need to stop eating and drinking before my surgery?      Do not eat any food after midnight the night before your surgery/procedure. You may  have up to 13.5 ounces of clear liquid until TWO hours before your instructed arrival time to the hospital.  This includes water, black tea/coffee, (no milk or cream) apple juice, and electrolyte drinks (Gatorade). You may chew gum until TWO hours before your surgery/procedure , Do not eat any food or drink any liquids after midnight the night before your surgery/procedure.  You may have sips of water to take medications.            Simple things you can do to help prevent blood clots     Blood clots are blockages that can form in the body's veins. When a blood clot forms in your deep veins, it may be called a deep vein thrombosis, or DVT for short. Blood clots can happen in any part of the body where blood flows, but they are most common in the arms and legs. If a piece of a blood clot breaks free and travels to the lungs, it is called a pulmonary embolus (PE). A PE can be a very serious problem.         Being in the hospital or having surgery can raise your chances of getting a blood clot because you may not be well enough to move around as much as you normally do.         Ways you can help prevent blood clots in the hospital       Wearing SCDs  SCDs stands for Sequential Compression Devices.   SCDs are special sleeves that wrap around your legs. They attach to a pump that fills them with air to gently squeeze your legs every few minutes.  This helps return the blood in your legs to your heart.   SCDs should only be taken off when walking or bathing. SCDs may not be comfortable, but they can help save your life.              Pump SCD leg sleeves  Wearing compression stockings - if your doctor orders them. These special snug-fitting stockings gently squeeze your legs to help blood flow.       Walking. Walking helps move the blood in your legs.   If your doctor says it is ok, try walking the halls at least   5 times a day. Ask us to help you get up, so you don't fall.      Taking any blood-thinning medicines your  "doctor orders.              Ways you can help prevent blood clots at home         Wearing compression stockings - if your doctor orders them.   Walking - to help move the blood in your legs.    Taking any blood-thinning medicines your doctor orders.      Signs of a blood clot or PE    Tell your doctor or nurse right away if you have any of the problems listed below.         If you are at home, seek medical care right away. Call 911 for chest pain or problems breathing.            Signs of a blood clot (DVT) - such as pain, swelling, redness, or warmth in your arm or legs.  Signs of a pulmonary embolism (PE) - such as chest pain or feeling short of breath      Tobacco and Alcohol;  Do not drink alcohol or smoke within 24 hours of surgery.  It is best to quit smoking for as long as possible before any surgery or procedure.     Other Instructions  Why did I have my nose, under my arms, and groin swabbed? The purpose of the swab is to identify Staphylococcus aureus inside your nose or on your skin.  The swab was sent to the laboratory for culture.  A positive swab/culture for Staphylococcus aureus is called colonization or carriage.   What is Staphylococcus aureus? Staphylococcus aureus, also known as \"staph\", is a germ found on the skin or in the nose of healthy people.  Sometimes Staphylococcus aureus can get into the body and cause an infection.  This can be minor (such as pimples, boils, or other skin problems).  It might also be serious (such as a blood infection, pneumonia, or a surgical site infection). What is Staphylococcus aureus colonization or carriage? Colonization or carriage means that a person has the germ but is not sick from it.  These bacteria can be spread on the hands or when breathing or sneezing. How is Staphylococcus aureus spread? It is most often spread by close contact with a person or item that carries it. What happens if my culture is positive for Staphylococcus aureus? Your doctor/medical " team will use this information to guide any antibiotic treatment which may be necessary.  Regardless of the culture results, we will clean the inside of your nose with a betadine swab just before you have your surgery. Will I get an infection if I have Staphylococcus aureus in my nose or on my skin? Anyone can get an infection with Staphylococcus aureus.  However, the best way to reduce your risk of infection is to follow the instructions provided to you for the use of your CHG soap and dental rinse.  , Body Wash; What is a home preoperative antibacterial shower? This shower is a way of cleaning the skin with a germ-killing solution before surgery.  The solution contains chlorhexidine, commonly known as CHG.  CHG is a skin cleanser with germ-killing ability.  Let your doctor know if you are allergic to chlorhexidine. Why do I need to take a preoperative antibacterial shower? Skin is not sterile.  It is best to try to make your skin as free of germs as possible before surgery.  Proper cleansing with a germ-killing soap before surgery can lower the number of germs on your skin.  This helps to reduce the risk of infection at the surgical site.  Following the instructions listed below will help you prepare your skin for surgery.   How do I use the solution? Steps:  Begin using your CHG soap 5 days before your scheduled surgery on ___________.   First, wash and rinse your hair using the CHG soap. Keep CHG soap away from ear canals and eyes.  Rinse completely, do not condition.  Hair extensions should be removed. , Oral/Dental Rinse: What is oral/dental rinse?  It is a mouthwash. It is a way of cleaning the mouth with a germ-killing solution before your surgery.  The solution contains chlorhexidine, commonly known as CHG. It is used inside the mouth to kill a bacteria known as Staphylococcus aureus.  Let your doctor know if you are allergic to Chlorhexidine. Why do I need to use CHG oral/dental rinse? The CHG oral/dental  rinse helps to kill a bacteria in your mouth known as Staphylococcus aureus.  This reduces the risk of infection at the surgical site.  Using your CHG oral/dental rinse STEPS: Use your CHG oral/dental rinse after you brush your teeth the night before (at bedtime) and the morning of your surgery.  Follow all directions on your prescription label.  Use the cap on the container to measure 15 ml.  Swish (gargle if you can) the mouthwash in your mouth for at least 30 seconds, (do not swallow) and spit out.  After you use your CHG rinse, do not rinse your mouth with water, drink or eat.  Please refer to the prescription label for the appropriate time to resume oral intake What side effects might I have using the CHG oral/dental rinse? CHG rinse will stick to plaque on the teeth.  Brush and floss just before use.  Teeth brushing will help avoid staining of plaque during use.           The Week before Surgery        Seven days before Surgery  Check your [facility] medication instructions  Do the exercises provided to you by [facility]   Arrange for a responsible, adult licensed  to take you home after surgery and stay with you for 24 hours.  You will not be permitted to drive yourself home if you have received any anesthetic/sedation  Six days before surgery  Check your [facility] medication instructions  Do the exercises provided to you by [facility]   Start using Chlorhexidene (CHG) body wash if prescribed  Five days before surgery  Check your [facility] medication instructions  Do the exercises provided to you by [facility]   Continue to use CHG body wash if prescribed  Three days before surgery  Check your [facility] medication instructions  Do the exercises provided to you by [facility]   Continue to use CHG body wash if prescribed  Two days before surgery  Check your [facility] medication instructions  Do the exercises provided to you by [facility]   Continue to use CHG body wash if prescribed    The Day before Surgery       Check your [facility] medication and all other [facility] instructions  including when to stop eating and drinking  You will be called with your arrival time for surgery in the late afternoon.  If you do not receive a call please reach out to your surgeon's office.  Do not smoke or drink 24 hours before surgery  Prepare items to bring with you to the hospital  Shower with your chlorhexidine wash if prescribed  Brush your teeth and use your chlorhexidine dental rinse if prescribed    The Day of Surgery       Check your [facility] medication instructions  Ensure you follow the instructions for when to stop eating and drinking  Shower, if prescribed use CHG.  Do not apply any lotions, creams, moisturizers, perfume or deodorant  Brush your teeth and use your CHG dental rinse if prescribed  Wear loose comfortable clothing  Avoid make-up  Remove  jewelry and piercings, consider professional piercing removal with a plastic spacer if needed  Bring photo ID and Insurance card  Bring an accurate medication list that includes medication dose, frequency and allergies  Bring a copy of your advanced directives (will, health care power of )  Bring any devices and controllers as well as medical devices you have been provided with for surgery (CPAP, slings, braces, etc.)  Dentures, eyeglasses, and contacts will be removed before surgery, please bring cases for contacts or glasses

## 2024-10-17 ENCOUNTER — ANESTHESIA EVENT (OUTPATIENT)
Dept: OPERATING ROOM | Facility: HOSPITAL | Age: 24
End: 2024-10-17
Payer: MEDICARE

## 2024-10-17 LAB — HOLD SPECIMEN: NORMAL

## 2024-10-18 LAB
BACTERIA UR CULT: ABNORMAL
BACTERIA UR CULT: ABNORMAL
STAPHYLOCOCCUS SPEC CULT: NORMAL

## 2024-10-21 DIAGNOSIS — N39.0 ACUTE UTI: ICD-10-CM

## 2024-10-21 RX ORDER — SULFAMETHOXAZOLE AND TRIMETHOPRIM 800; 160 MG/1; MG/1
1 TABLET ORAL 2 TIMES DAILY
Qty: 28 TABLET | Refills: 0 | Status: SHIPPED | OUTPATIENT
Start: 2024-10-21 | End: 2024-11-04

## 2024-10-24 ENCOUNTER — ANESTHESIA (OUTPATIENT)
Dept: OPERATING ROOM | Facility: HOSPITAL | Age: 24
End: 2024-10-24
Payer: MEDICARE

## 2024-10-24 ENCOUNTER — HOSPITAL ENCOUNTER (OUTPATIENT)
Facility: HOSPITAL | Age: 24
Setting detail: OUTPATIENT SURGERY
Discharge: HOME | End: 2024-10-24
Attending: STUDENT IN AN ORGANIZED HEALTH CARE EDUCATION/TRAINING PROGRAM | Admitting: STUDENT IN AN ORGANIZED HEALTH CARE EDUCATION/TRAINING PROGRAM
Payer: MEDICARE

## 2024-10-24 VITALS
OXYGEN SATURATION: 95 % | SYSTOLIC BLOOD PRESSURE: 114 MMHG | DIASTOLIC BLOOD PRESSURE: 71 MMHG | TEMPERATURE: 97.1 F | RESPIRATION RATE: 18 BRPM | BODY MASS INDEX: 22.73 KG/M2 | HEIGHT: 60 IN | HEART RATE: 75 BPM

## 2024-10-24 DIAGNOSIS — N21.0 BLADDER CALCULUS: ICD-10-CM

## 2024-10-24 DIAGNOSIS — N31.9 NEUROGENIC BLADDER: ICD-10-CM

## 2024-10-24 DIAGNOSIS — R33.9 URINARY RETENTION WITH INCOMPLETE BLADDER EMPTYING: Primary | ICD-10-CM

## 2024-10-24 LAB — PREGNANCY TEST URINE, POC: NEGATIVE

## 2024-10-24 PROCEDURE — 3700000002 HC GENERAL ANESTHESIA TIME - EACH INCREMENTAL 1 MINUTE: Performed by: STUDENT IN AN ORGANIZED HEALTH CARE EDUCATION/TRAINING PROGRAM

## 2024-10-24 PROCEDURE — 2500000004 HC RX 250 GENERAL PHARMACY W/ HCPCS (ALT 636 FOR OP/ED)

## 2024-10-24 PROCEDURE — 2500000001 HC RX 250 WO HCPCS SELF ADMINISTERED DRUGS (ALT 637 FOR MEDICARE OP): Performed by: STUDENT IN AN ORGANIZED HEALTH CARE EDUCATION/TRAINING PROGRAM

## 2024-10-24 PROCEDURE — 2500000004 HC RX 250 GENERAL PHARMACY W/ HCPCS (ALT 636 FOR OP/ED): Performed by: STUDENT IN AN ORGANIZED HEALTH CARE EDUCATION/TRAINING PROGRAM

## 2024-10-24 PROCEDURE — P9045 ALBUMIN (HUMAN), 5%, 250 ML: HCPCS | Mod: JZ

## 2024-10-24 PROCEDURE — 51050 REMOVAL OF BLADDER STONE: CPT | Performed by: STUDENT IN AN ORGANIZED HEALTH CARE EDUCATION/TRAINING PROGRAM

## 2024-10-24 PROCEDURE — 2500000005 HC RX 250 GENERAL PHARMACY W/O HCPCS: Performed by: STUDENT IN AN ORGANIZED HEALTH CARE EDUCATION/TRAINING PROGRAM

## 2024-10-24 PROCEDURE — 3600000003 HC OR TIME - INITIAL BASE CHARGE - PROCEDURE LEVEL THREE: Performed by: STUDENT IN AN ORGANIZED HEALTH CARE EDUCATION/TRAINING PROGRAM

## 2024-10-24 PROCEDURE — 3600000008 HC OR TIME - EACH INCREMENTAL 1 MINUTE - PROCEDURE LEVEL THREE: Performed by: STUDENT IN AN ORGANIZED HEALTH CARE EDUCATION/TRAINING PROGRAM

## 2024-10-24 PROCEDURE — 7100000002 HC RECOVERY ROOM TIME - EACH INCREMENTAL 1 MINUTE: Performed by: STUDENT IN AN ORGANIZED HEALTH CARE EDUCATION/TRAINING PROGRAM

## 2024-10-24 PROCEDURE — 2500000001 HC RX 250 WO HCPCS SELF ADMINISTERED DRUGS (ALT 637 FOR MEDICARE OP)

## 2024-10-24 PROCEDURE — 2500000004 HC RX 250 GENERAL PHARMACY W/ HCPCS (ALT 636 FOR OP/ED): Mod: JW | Performed by: STUDENT IN AN ORGANIZED HEALTH CARE EDUCATION/TRAINING PROGRAM

## 2024-10-24 PROCEDURE — 99223 1ST HOSP IP/OBS HIGH 75: CPT

## 2024-10-24 PROCEDURE — 7100000001 HC RECOVERY ROOM TIME - INITIAL BASE CHARGE: Performed by: STUDENT IN AN ORGANIZED HEALTH CARE EDUCATION/TRAINING PROGRAM

## 2024-10-24 PROCEDURE — 2720000007 HC OR 272 NO HCPCS: Performed by: STUDENT IN AN ORGANIZED HEALTH CARE EDUCATION/TRAINING PROGRAM

## 2024-10-24 PROCEDURE — 82365 CALCULUS SPECTROSCOPY: CPT | Performed by: STUDENT IN AN ORGANIZED HEALTH CARE EDUCATION/TRAINING PROGRAM

## 2024-10-24 PROCEDURE — 3700000001 HC GENERAL ANESTHESIA TIME - INITIAL BASE CHARGE: Performed by: STUDENT IN AN ORGANIZED HEALTH CARE EDUCATION/TRAINING PROGRAM

## 2024-10-24 PROCEDURE — 7100000009 HC PHASE TWO TIME - INITIAL BASE CHARGE: Performed by: STUDENT IN AN ORGANIZED HEALTH CARE EDUCATION/TRAINING PROGRAM

## 2024-10-24 PROCEDURE — 2500000005 HC RX 250 GENERAL PHARMACY W/O HCPCS

## 2024-10-24 PROCEDURE — 64999 UNLISTED PX NERVOUS SYSTEM: CPT

## 2024-10-24 PROCEDURE — 7100000010 HC PHASE TWO TIME - EACH INCREMENTAL 1 MINUTE: Performed by: STUDENT IN AN ORGANIZED HEALTH CARE EDUCATION/TRAINING PROGRAM

## 2024-10-24 RX ORDER — VANCOMYCIN HYDROCHLORIDE 1 G/200ML
1000 INJECTION, SOLUTION INTRAVENOUS ONCE
Status: COMPLETED | OUTPATIENT
Start: 2024-10-24 | End: 2024-10-24

## 2024-10-24 RX ORDER — CIPROFLOXACIN 2 MG/ML
400 INJECTION, SOLUTION INTRAVENOUS ONCE
Status: COMPLETED | OUTPATIENT
Start: 2024-10-24 | End: 2024-10-24

## 2024-10-24 RX ORDER — ROCURONIUM BROMIDE 10 MG/ML
INJECTION, SOLUTION INTRAVENOUS AS NEEDED
Status: DISCONTINUED | OUTPATIENT
Start: 2024-10-24 | End: 2024-10-24

## 2024-10-24 RX ORDER — HYDROMORPHONE HYDROCHLORIDE 1 MG/ML
0.2 INJECTION, SOLUTION INTRAMUSCULAR; INTRAVENOUS; SUBCUTANEOUS EVERY 5 MIN PRN
Status: DISCONTINUED | OUTPATIENT
Start: 2024-10-24 | End: 2024-10-24 | Stop reason: HOSPADM

## 2024-10-24 RX ORDER — PHENYLEPHRINE HCL IN 0.9% NACL 0.4MG/10ML
SYRINGE (ML) INTRAVENOUS AS NEEDED
Status: DISCONTINUED | OUTPATIENT
Start: 2024-10-24 | End: 2024-10-24

## 2024-10-24 RX ORDER — ACETAMINOPHEN 325 MG/1
650 TABLET ORAL EVERY 4 HOURS PRN
Status: DISCONTINUED | OUTPATIENT
Start: 2024-10-24 | End: 2024-10-24 | Stop reason: HOSPADM

## 2024-10-24 RX ORDER — DEXMEDETOMIDINE HYDROCHLORIDE 4 UG/ML
INJECTION, SOLUTION INTRAVENOUS CONTINUOUS PRN
Status: DISCONTINUED | OUTPATIENT
Start: 2024-10-24 | End: 2024-10-24

## 2024-10-24 RX ORDER — MIDAZOLAM HYDROCHLORIDE 1 MG/ML
INJECTION INTRAMUSCULAR; INTRAVENOUS AS NEEDED
Status: DISCONTINUED | OUTPATIENT
Start: 2024-10-24 | End: 2024-10-24

## 2024-10-24 RX ORDER — ACETAMINOPHEN 325 MG/1
TABLET ORAL AS NEEDED
Status: DISCONTINUED | OUTPATIENT
Start: 2024-10-24 | End: 2024-10-24

## 2024-10-24 RX ORDER — PROPOFOL 10 MG/ML
INJECTION, EMULSION INTRAVENOUS AS NEEDED
Status: DISCONTINUED | OUTPATIENT
Start: 2024-10-24 | End: 2024-10-24

## 2024-10-24 RX ORDER — LIDOCAINE HYDROCHLORIDE 10 MG/ML
0.1 INJECTION, SOLUTION EPIDURAL; INFILTRATION; INTRACAUDAL; PERINEURAL ONCE
Status: DISCONTINUED | OUTPATIENT
Start: 2024-10-24 | End: 2024-10-24 | Stop reason: HOSPADM

## 2024-10-24 RX ORDER — ONDANSETRON HYDROCHLORIDE 2 MG/ML
4 INJECTION, SOLUTION INTRAVENOUS ONCE AS NEEDED
Status: DISCONTINUED | OUTPATIENT
Start: 2024-10-24 | End: 2024-10-24 | Stop reason: HOSPADM

## 2024-10-24 RX ORDER — ONDANSETRON HYDROCHLORIDE 2 MG/ML
INJECTION, SOLUTION INTRAVENOUS AS NEEDED
Status: DISCONTINUED | OUTPATIENT
Start: 2024-10-24 | End: 2024-10-24

## 2024-10-24 RX ORDER — SODIUM CHLORIDE 0.9 G/100ML
IRRIGANT IRRIGATION AS NEEDED
Status: DISCONTINUED | OUTPATIENT
Start: 2024-10-24 | End: 2024-10-24 | Stop reason: HOSPADM

## 2024-10-24 RX ORDER — LIDOCAINE HYDROCHLORIDE 20 MG/ML
INJECTION, SOLUTION INFILTRATION; PERINEURAL AS NEEDED
Status: DISCONTINUED | OUTPATIENT
Start: 2024-10-24 | End: 2024-10-24

## 2024-10-24 RX ORDER — METOCLOPRAMIDE HYDROCHLORIDE 5 MG/ML
10 INJECTION INTRAMUSCULAR; INTRAVENOUS ONCE AS NEEDED
Status: DISCONTINUED | OUTPATIENT
Start: 2024-10-24 | End: 2024-10-24 | Stop reason: HOSPADM

## 2024-10-24 RX ORDER — BUPIVACAINE HYDROCHLORIDE 2.5 MG/ML
INJECTION, SOLUTION EPIDURAL; INFILTRATION; INTRACAUDAL AS NEEDED
Status: DISCONTINUED | OUTPATIENT
Start: 2024-10-24 | End: 2024-10-24 | Stop reason: HOSPADM

## 2024-10-24 RX ORDER — HYDROMORPHONE HYDROCHLORIDE 1 MG/ML
0.5 INJECTION, SOLUTION INTRAMUSCULAR; INTRAVENOUS; SUBCUTANEOUS EVERY 5 MIN PRN
Status: DISCONTINUED | OUTPATIENT
Start: 2024-10-24 | End: 2024-10-24 | Stop reason: HOSPADM

## 2024-10-24 RX ORDER — NORETHINDRONE AND ETHINYL ESTRADIOL 0.5-0.035
KIT ORAL AS NEEDED
Status: DISCONTINUED | OUTPATIENT
Start: 2024-10-24 | End: 2024-10-24

## 2024-10-24 RX ORDER — ROPIVACAINE HYDROCHLORIDE 5 MG/ML
INJECTION, SOLUTION EPIDURAL; INFILTRATION; PERINEURAL AS NEEDED
Status: DISCONTINUED | OUTPATIENT
Start: 2024-10-24 | End: 2024-10-24

## 2024-10-24 RX ORDER — FENTANYL CITRATE 50 UG/ML
INJECTION, SOLUTION INTRAMUSCULAR; INTRAVENOUS AS NEEDED
Status: DISCONTINUED | OUTPATIENT
Start: 2024-10-24 | End: 2024-10-24

## 2024-10-24 RX ORDER — OXYCODONE HYDROCHLORIDE 5 MG/1
5 TABLET ORAL EVERY 4 HOURS PRN
Status: DISCONTINUED | OUTPATIENT
Start: 2024-10-24 | End: 2024-10-24 | Stop reason: HOSPADM

## 2024-10-24 RX ORDER — LABETALOL HYDROCHLORIDE 5 MG/ML
5 INJECTION, SOLUTION INTRAVENOUS ONCE AS NEEDED
Status: DISCONTINUED | OUTPATIENT
Start: 2024-10-24 | End: 2024-10-24 | Stop reason: HOSPADM

## 2024-10-24 RX ORDER — SODIUM CHLORIDE, SODIUM LACTATE, POTASSIUM CHLORIDE, CALCIUM CHLORIDE 600; 310; 30; 20 MG/100ML; MG/100ML; MG/100ML; MG/100ML
100 INJECTION, SOLUTION INTRAVENOUS CONTINUOUS
Status: DISCONTINUED | OUTPATIENT
Start: 2024-10-24 | End: 2024-10-24 | Stop reason: HOSPADM

## 2024-10-24 RX ORDER — KETOROLAC TROMETHAMINE 30 MG/ML
INJECTION, SOLUTION INTRAMUSCULAR; INTRAVENOUS AS NEEDED
Status: DISCONTINUED | OUTPATIENT
Start: 2024-10-24 | End: 2024-10-24

## 2024-10-24 RX ORDER — ALBUMIN HUMAN 50 G/1000ML
SOLUTION INTRAVENOUS AS NEEDED
Status: DISCONTINUED | OUTPATIENT
Start: 2024-10-24 | End: 2024-10-24

## 2024-10-24 RX ORDER — OXYCODONE HYDROCHLORIDE 5 MG/1
5 TABLET ORAL EVERY 6 HOURS PRN
Qty: 15 TABLET | Refills: 0 | Status: SHIPPED | OUTPATIENT
Start: 2024-10-24 | End: 2024-10-31 | Stop reason: ALTCHOICE

## 2024-10-24 RX ORDER — HYDRALAZINE HYDROCHLORIDE 20 MG/ML
5 INJECTION INTRAMUSCULAR; INTRAVENOUS EVERY 30 MIN PRN
Status: DISCONTINUED | OUTPATIENT
Start: 2024-10-24 | End: 2024-10-24 | Stop reason: HOSPADM

## 2024-10-24 RX ORDER — METHOCARBAMOL 100 MG/ML
1000 INJECTION, SOLUTION INTRAMUSCULAR; INTRAVENOUS ONCE
Status: COMPLETED | OUTPATIENT
Start: 2024-10-24 | End: 2024-10-24

## 2024-10-24 RX ORDER — WATER 1 ML/ML
IRRIGANT IRRIGATION AS NEEDED
Status: DISCONTINUED | OUTPATIENT
Start: 2024-10-24 | End: 2024-10-24 | Stop reason: HOSPADM

## 2024-10-24 RX ORDER — SODIUM CHLORIDE, SODIUM LACTATE, POTASSIUM CHLORIDE, CALCIUM CHLORIDE 600; 310; 30; 20 MG/100ML; MG/100ML; MG/100ML; MG/100ML
20 INJECTION, SOLUTION INTRAVENOUS CONTINUOUS
Status: DISCONTINUED | OUTPATIENT
Start: 2024-10-24 | End: 2024-10-24 | Stop reason: HOSPADM

## 2024-10-24 RX ADMIN — OXYCODONE HYDROCHLORIDE 5 MG: 5 TABLET ORAL at 11:30

## 2024-10-24 RX ADMIN — METHOCARBAMOL 1000 MG: 100 INJECTION INTRAMUSCULAR; INTRAVENOUS at 11:30

## 2024-10-24 ASSESSMENT — PAIN - FUNCTIONAL ASSESSMENT
PAIN_FUNCTIONAL_ASSESSMENT: 0-10
PAIN_FUNCTIONAL_ASSESSMENT: UNABLE TO SELF-REPORT
PAIN_FUNCTIONAL_ASSESSMENT: 0-10
PAIN_FUNCTIONAL_ASSESSMENT: UNABLE TO SELF-REPORT
PAIN_FUNCTIONAL_ASSESSMENT: 0-10
PAIN_FUNCTIONAL_ASSESSMENT: 0-10
PAIN_FUNCTIONAL_ASSESSMENT: UNABLE TO SELF-REPORT
PAIN_FUNCTIONAL_ASSESSMENT: 0-10

## 2024-10-24 ASSESSMENT — PAIN SCALES - GENERAL
PAINLEVEL_OUTOF10: 0 - NO PAIN
PAINLEVEL_OUTOF10: 0 - NO PAIN
PAINLEVEL_OUTOF10: 5 - MODERATE PAIN
PAINLEVEL_OUTOF10: 7
PAINLEVEL_OUTOF10: 0 - NO PAIN

## 2024-10-24 ASSESSMENT — COLUMBIA-SUICIDE SEVERITY RATING SCALE - C-SSRS
1. IN THE PAST MONTH, HAVE YOU WISHED YOU WERE DEAD OR WISHED YOU COULD GO TO SLEEP AND NOT WAKE UP?: NO
6. HAVE YOU EVER DONE ANYTHING, STARTED TO DO ANYTHING, OR PREPARED TO DO ANYTHING TO END YOUR LIFE?: NO
2. HAVE YOU ACTUALLY HAD ANY THOUGHTS OF KILLING YOURSELF?: NO

## 2024-10-24 ASSESSMENT — PAIN DESCRIPTION - LOCATION: LOCATION: ABDOMEN

## 2024-10-24 NOTE — DISCHARGE INSTRUCTIONS
DEPARTMENT OF Urology  DISCHARGE INSTRUCTIONS     Call [phone] during regular daytime business hours (8:00 am - 5:00 pm) and after 5:00 pm and ask for the Urology resident with any questions or concerns.    If it is a life-threatening situation, proceed to the nearest emergency department.        Thank you for the opportunity to care for you today.  Your health and healing are very important to us.  We hope we made you feel as comfortable as possible and are committed to your recovery and continued well-being.      The following is a brief overview of your pyeloplasty procedure. Some of the information contained on this summary may be confidential.  This information should be kept in your records and should be shared with your regular doctor.    What to Expect During your Recovery and Home Care  Anesthesia Side Effects   You may feel sleepy, tired, or have a sore throat.   You may also feel drowsiness, dizziness, or inability to think clearly.  For your safety, do not drive, drink alcoholic beverages, take any unprescribed medication or make any important decisions for 24 hours after anesthesia.  A responsible adult should be with you for 24 hours.        Activity and Recovery    No heavy lifting greater than 10 pounds for the next 4 weeks. No driving until mobility has returned to normal. Do not drive or operate heavy machinery while taking narcotic pain medications as these medications can alter perception, impair judgement, and slow reaction times.    Pain Control  Unfortunately, you may experience pain after your procedure. Frequency and urgency to urinate and mild discomfort are expected. Adequate pain management can include alternative measures to help ease your pain and that can include over the counter Tylenol/ibuprofen or oxycodone which can be taken as prescribed as needed for breakthrough pain. Do not take more than 4,000mg of Tylenol in a 24-hour period.      Your procedure was done robotically so  you may experience gas pains from the surgery. Getting up and moving around is the best way to relieve those pains. You can also take some over the counter gas-x(simethicone).    You may have also been prescribed Pyridium (for burning sensation) and Ditropan (for bladder spasms) for pain control. Pyridium will turn your urine bright orange.    Nausea/Vomiting   Clear liquids are best tolerated at first. Start slow, advance your diet as tolerated to normal foods. Avoid spicy, greasy, heavy foods at first. Also, you may feel nauseous or like you need to vomit if you take any type of medication on an empty stomach.  Call your physician if you are unable to eat or drink, are not passing gas and have persistent vomiting.    Signs of Bleeding   You could have some blood in your urine off and on over the next several weeks. Your urine will be light pink to yellow. Minor bleeding or drainage may occur from the surgical sites; however, excessive or consistent bleeding should be reported to your surgeon. Excessive bleeding is defined as blood that is dripping from wound, soaking you bandages, and is ketchup colored, thick with possible blood clots.  Consistent is defined as bleeding that does not stop.      Treatment/wound care:   Keep area(s) clean and dry.   It is okay to shower 24 hours after time of surgery.    Do not scrub wound(s), pat dry.    Do not submerge wound(s) in standing water until seen for follow up appointment (no tub bathing, swimming, or hot tubs).    Clean with mild soap, gentle washing, pat dry, cover with bandage as needed.    Avoid waterproof bandages.  No oils or lotions on incisions.    Please visually inspect your wound(s) at least once daily.  If the wound(s) are in a difficult to see location, please use a mirror or have someone else assist with visual inspection.    Signs of Infection  Signs of infection can include fever, chills, redness around surgical incisions, green/yellow drainage from  incisions, burning sensation with urination or severe abdominal pain.  If you see any of these occur, please contact your doctor's office at [phone].  Any fever higher than 100.4, especially if associated with an ill feeling, abdominal pain, chills, or nausea should be reported to your surgeon.      Assist in bowel movements/urination  Take daily stool softener you were prescribed  Increase fiber in diet  Increase water (6 to 8 glasses)  Increase walking   Can try adding over the counter MiraLAX or in extreme cases milk of magnesia.  If you have tried these methods and you are not passing gas, your bladder still feels full and you cannot have a bowel movement, please go to your nearest Emergency room/contact your physician.    Additional Instructions:   Use a small pillow to put pressure on your belly. This can make you more comfortable when you cough, laugh or do other actions.     CATHETER CARE  Always keep the catheters tubing and drainage bag below the level of your bladder.  Avoid loops and kinks in the catheter tubing.  NOTIFY your physician if catheter falls out or catheter seems clogged, and urine is not draining.   Do not wear the small leg bag to bed you should be provided with a larger overnight bag that you should wear to bed and can hang over the side of the bed.  We recommend wearing the large bag in the shower as well as this is easy to dry, and you do not get your leg straps wet from your leg bag.   Your catheter should be secured to your upper thigh, do not allow it to hang or dangle.  Your catheter will be removed at your post-operative appointment.   Clean around catheter insertion site daily with mild soap and warm water.  You may irrigate the catheter gently with a few syringes of irrigation until the catheter is removed

## 2024-10-24 NOTE — ANESTHESIA PREPROCEDURE EVALUATION
Patient: Slade Hauser    Procedure Information       Date/Time: 10/24/24 0700    Procedure: Open Cystolithotomy    Location: [facility] OR 04 / Virtual [facility] OR    Surgeons: Moose Chnu MD            Relevant Problems   Anesthesia (within normal limits)      Cardiac (within normal limits)      Pulmonary (within normal limits)      Neuro   (+) Anxiety disorder   (+) Depression   (+)  (ventriculoperitoneal) shunt status      /Renal (within normal limits)      Liver (within normal limits)      Endocrine (within normal limits)      Hematology (within normal limits)      Nervous   (+) Closed lumbar spina bifida with hydrocephalus (Multi)   (+) Spina bifida      Genitourinary   (+) Bladder calculus   (+) Neurogenic bladder       Clinical information reviewed:    Allergies  Meds     OB Status           Allergies   Allergen Reactions   • Animal Dander Unknown   • House Dust Unknown   • Latex Unknown   • Pollen Extracts Unknown      No current facility-administered medications on file prior to encounter.     Current Outpatient Medications on File Prior to Encounter   Medication Sig Dispense Refill   • syringe, disposable, 60 mL syringe 60 mL 2 times a day. 10 each 11        NPO Detail:  No data recorded     Physical Exam    Airway  Mallampati: I  TM distance: >3 FB  Neck ROM: full  Comments: Can bite upper lip a little   Cardiovascular - normal exam  Rhythm: regular  Rate: normal     Dental - normal exam     Pulmonary - normal exam     Abdominal          Anesthesia Plan    History of general anesthesia?: yes  History of complications of general anesthesia?: no    ASA 2     general     intravenous induction   Postoperative administration of opioids is intended.  Trial extubation is planned.  Anesthetic plan and risks discussed with patient.  Use of blood products discussed with patient who consented to blood products.    Plan discussed with resident and attending.

## 2024-10-24 NOTE — CONSULTS
Slade Hauser is a 24 y.o. year old female patient who presents for Procedure(s):  Open Cystolithotomy with Moose Chun MD on 10/24/2024.  Acute Pain consulted for assistance with pain control.     Anticipated Postop Pain Issues -   Palliative: typically relieved with IV analgesics and regional local anesthetics  Provocative: typically with movement  Quality: typically burning and aching  Radiation: typically none  Severity: typically severe 8-10/10  Timing: typically constant    Past Medical History:   Diagnosis Date    Bladder stones     Closed lumbar spina bifida with hydrocephalus (Multi)     Headache, unspecified 05/13/2019    Frequent headaches    Myelomeningocele with hydrocephalus (Multi)     .lumbarmyelomeningocele repaired at birth, no re-tethering.    Neurogenic bladder     s/p bladder augment with Dr. Marrufo    Other symptoms and signs involving the musculoskeletal system 05/13/2019    Limb weakness    Personal history of diseases of the blood and blood-forming organs and certain disorders involving the immune mechanism     History of anemia    Personal history of other diseases of urinary system     History of bladder problems    Presence of cerebrospinal fluid drainage device 09/30/2021     (ventriculoperitoneal) shunt status    Urinary tract infection     recurring UTI's        Past Surgical History:   Procedure Laterality Date    BLADDER SURGERY  09/18/2013    Bladder Surgery    CYSTOURETHROSCOPY      OTHER SURGICAL HISTORY  09/18/2013    Cranial Tap Of Shunt Tubing    OTHER SURGICAL HISTORY  09/12/2016    Bladder Enterocystoplasty    VENTRICULOPERITONEAL SHUNT          No family history on file.     Social History     Socioeconomic History    Marital status: Single     Spouse name: Not on file    Number of children: Not on file    Years of education: Not on file    Highest education level: Not on file   Occupational History    Not on file   Tobacco Use    Smoking status: Never    Smokeless  tobacco: Current   Vaping Use    Vaping status: Some Days   Substance and Sexual Activity    Alcohol use: Yes     Comment: rare    Drug use: Never    Sexual activity: Not on file   Other Topics Concern    Not on file   Social History Narrative    Not on file     Social Drivers of Health     Financial Resource Strain: Not on file   Food Insecurity: Not on file   Transportation Needs: Not on file   Physical Activity: Not on file   Stress: Not on file   Social Connections: Not on file   Intimate Partner Violence: Not on file   Housing Stability: Not on file        Allergies   Allergen Reactions    Animal Dander Unknown    House Dust Unknown    Latex Unknown    Pollen Extracts Unknown         Review of Systems  Gen: No fatigue, anorexia, insomnia, fever.   Eyes: No vision loss, double vision, drainage, eye pain.   ENT: No pharyngitis, dry mouth, no hearing changes or ear discharge  Cardiac: No chest pain, palpitations, syncope, near syncope.   Pulmonary: No shortness of breath, cough, hemoptysis.   Heme/lymph: No swollen glands, fever, bleeding.   GI: No abdominal pain, change in bowel habits, melena, hematemesis, hematochezia, nausea, vomiting, diarrhea.   : No discharge, dysuria, frequency, urgency, hematuria.  Endo: No polyuria or weight loss.   Musculoskeletal: Negative for any pain or loss of ROM/weakness  Skin: No rashes or lesions  Neuro: Normal speech, no numbness or weakness. No gait difficulties  Review of systems is otherwise negative unless stated above or in history of present illness.    Physical Exam:  Constitutional:  no distress, alert and cooperative  Eyes: clear sclera  Head/Neck: No apparent injury, trachea midline  Respiratory/Thorax: Patent airways, thorax symmetric, breathing comfortably  Cardiovascular: no pitting edema  Gastrointestinal: Nondistended  Musculoskeletal: ROM intact  Extremities: no clubbing  Neurological: alert, davis x4  Psychological: Appropriate affect    Results for orders  placed or performed during the hospital encounter of 10/24/24 (from the past 24 hours)   POCT pregnancy, urine manually resulted   Result Value Ref Range    Preg Test, Ur Negative Negative        Slade Hauser is a 24 y.o. year old female patient who presents for Procedure(s):  Open Cystolithotomy with Moose Chun MD on 10/24/2024. Acute Pain consulted for assistance with pain control.     Plan:    - Bilateral single shot quadratus lumborum blocks performed pre-operatively 10/24/24  - Pain medications per primary team  - Will see on POD1 if inpatient    Dwain Horowitz, PGY1    Acute Pain Resident  pg [phone] ph [phone]

## 2024-10-24 NOTE — ANESTHESIA POSTPROCEDURE EVALUATION
Patient: Slade Hauser    Procedure Summary       Date: 10/24/24 Room / Location: [facility] OR 04 / [facility] OR    Anesthesia Start: 0738 Anesthesia Stop:     Procedure: Open Cystolithotomy (Bladder) Diagnosis:       Bladder calculus      Neurogenic bladder      (Bladder calculus [N21.0])      (Neurogenic bladder [N31.9])    Surgeons: Moose Chun MD Responsible Provider: Neetu Pham MD    Anesthesia Type: general ASA Status: 2            Anesthesia Type: general    Vitals Value Taken Time   BP 92/51 10/24/24 0946   Temp 36.2 °C (97.2 °F) 10/24/24 0930   Pulse 67 10/24/24 0954   Resp 13 10/24/24 0954   SpO2 99 % 10/24/24 0954   Vitals shown include unfiled device data.    Anesthesia Post Evaluation    Patient location during evaluation: PACU  Patient participation: complete - patient participated  Level of consciousness: awake and alert  Pain management: adequate  Multimodal analgesia pain management approach  Airway patency: patent  Two or more strategies used to mitigate risk of obstructive sleep apnea  Cardiovascular status: acceptable and blood pressure returned to baseline  Respiratory status: acceptable and airway suctioned  Hydration status: acceptable  Postoperative Nausea and Vomiting: none        No notable events documented.

## 2024-10-24 NOTE — ADDENDUM NOTE
Addendum  created 10/24/24 1409 by Dwain Horowitz MD    Clinical Note Signed, Intraprocedure Blocks edited

## 2024-10-24 NOTE — ANESTHESIA PROCEDURE NOTES
Airway  Date/Time: 10/24/2024 7:47 AM  Urgency: elective    Airway not difficult    Staffing  Performed: resident   Authorized by: Neetu Pham MD    Performed by: Camille Eaton MD  Patient location during procedure: OR    Indications and Patient Condition  Indications for airway management: anesthesia  Spontaneous Ventilation: absent  Sedation level: deep  Preoxygenated: yes  Patient position: sniffing  Mask difficulty assessment: 2 - vent by mask + OA or adjuvant +/- NMBA  Planned trial extubation    Final Airway Details  Final airway type: endotracheal airway      Successful airway: ETT  Cuffed: yes   Successful intubation technique: direct laryngoscopy  Facilitating devices/methods: intubating stylet  Endotracheal tube insertion site: oral  Blade: Eva  Blade size: #3  ETT size (mm): 7.0  Cormack-Lehane Classification: grade I - full view of glottis  Placement verified by: capnometry   Inital cuff pressure (cm H2O): 5  Measured from: lips  ETT to lips (cm): 22  Number of attempts at approach: 1  Ventilation between attempts: none  Number of other approaches attempted: 0           30-Mar-2018

## 2024-10-24 NOTE — ANESTHESIA PROCEDURE NOTES
Peripheral Block    Patient location during procedure: pre-op  Start time: 10/24/2024 7:15 AM  End time: 10/24/2024 7:35 AM  Reason for block: at surgeon's request and post-op pain management  Staffing  Performed: resident   Authorized by: Michael White MD    Performed by: Hope Adair DO  Preanesthetic Checklist  Completed: patient identified, IV checked, site marked, risks and benefits discussed, surgical consent, monitors and equipment checked, pre-op evaluation and timeout performed   Timeout performed at:   Peripheral Block  Patient position: laying flat  Prep: ChloraPrep  Patient monitoring: heart rate and continuous pulse ox  Block type: QL  Injection technique: single-shot  Guidance: ultrasound guided  Local infiltration: ropivacaine  Needle  Needle type: Tuohy   Needle gauge: 26 G  Needle length: 8 cm  Needle localization: ultrasound guidance     image stored in chart  Assessment  Injection assessment: negative aspiration for heme, no paresthesia on injection, incremental injection and local visualized surrounding nerve on ultrasound  Additional Notes  QL single shot. informed consent obtained. risks and benefits discussed. ASA monitors placed, timeout performed. Pt positioned, prepped with chlorhexidine, draped with sterile towels.     Ultrasound guidance used with visualization of the needle throughout duration of the procedure. Aspiration was negative. A total of Type of Local: 30 cc of 0.5% ropivacaine, dexamethasone 4mg, and 1:200,000 epinephrine, was divided and injected bilaterally. Patient tolerated procedure well.     Timeout by ENEDINA Batista

## 2024-10-24 NOTE — OP NOTE
Open Cystolithotomy Operative Note     Date: 10/24/2024  OR Location: [facility] OR    Name: Slade Hauser, : 2000, Age: 24 y.o., MRN: [id number], Sex: female    Diagnosis  Pre-op Diagnosis      * Bladder calculus [N21.0]     * Neurogenic bladder [N31.9] Post-op Diagnosis     * Bladder calculus [N21.0]     * Neurogenic bladder [N31.9]     Procedures  Open Cystolithotomy  06086 - OR CYSTOLITHOTOMY CYSTOTOMY W/RMVL CALCULUS      Surgeons      * Moose Chun - Primary    Resident/Fellow/Other Assistant:  Surgeons and Role:     * Eligio Burton MD - Fellow    Procedure Summary  Anesthesia: General  ASA: II  Anesthesia Staff: Anesthesiologist: Neetu Pham MD  Anesthesia Resident: Camille Eaton MD  Estimated Blood Loss: 10mL  Intra-op Medications:   Administrations occurring from 0700 to 1030 on 10/24/24:   Medication Name Total Dose   bupivacaine PF 0.25 % (Marcaine) 0.25 % (2.5 mg/mL) injection 20 mL   surgical lubricant gel 1 Application   sodium chloride 0.9 % irrigation solution 1,000 mL   sterile water irrigation solution 800 mL   acetaminophen (Tylenol) tablet 975 mg   albumin human bottle 5% 500 mL   dexAMETHasone (Decadron) 4 mg/mL 8 mg   dexmedeTOMIDine 4 mcg/mL in 100 mL NS infusion 2.89 mcg   ePHEDrine injection 15 mg   fentaNYL (Sublimaze) injection 50 mcg/mL 150 mcg   ketorolac (Toradol) 30 mg 30 mg   lactated Ringer's infusion Cannot be calculated   lidocaine (Xylocaine) injection 2 % 80 mg   midazolam PF (Versed) injection 1 mg/mL 2 mg   ondansetron 2 mg/mL 4 mg   phenylephrine 40 mcg/mL syringe 10 mL 800 mcg   propofol (Diprivan) injection 10 mg/mL 120 mg   rocuronium (ZeMuron) 50 mg/5 mL injection 40 mg   ropivacaine PF (Naropin) 0.5 % 20 mL   ciprofloxacin (Cipro) 400 mg in dextrose 5%  mL 400 mg   vancomycin (Vancocin) 1,000 mg in dextrose 5%  mL 1,000 mg              Anesthesia Record               Intraprocedure I/O Totals          Intake    Dexmedetomidine 0.00 mL    The total  shown is the total volume documented since Anesthesia Start was filed.    Total Intake 0 mL          Specimen:   ID Type Source Tests Collected by Time   A : BLADDER STONES Calculus Urine, Clean Catch CALCULI (STONE) ANALYSIS Moose Chun MD 10/24/2024 0833        Staff:   Circulator: Sharee  Scryocasta Person: Doron  Scrub Person: Daysi         Drains and/or Catheters:   Urethral Catheter Non-latex 14 Fr. (Active)       Tourniquet Times:         Implants:     Findings: several large stones removed enbloc from bladder    Indications: Slade Hauser is an 24 y.o. female who is having surgery for Bladder calculus [N21.0]  Neurogenic bladder [N31.9].     The patient was seen in the preoperative area. The risks, benefits, complications, treatment options, non-operative alternatives, expected recovery and outcomes were discussed with the patient. The possibilities of reaction to medication, pulmonary aspiration, injury to surrounding structures, bleeding, recurrent infection, the need for additional procedures, failure to diagnose a condition, and creating a complication requiring transfusion or operation were discussed with the patient. The patient concurred with the proposed plan, giving informed consent.  The site of surgery was properly noted/marked if necessary per policy. The patient has been actively warmed in preoperative area. Preoperative antibiotics have been ordered and given within 1 hours of incision. Venous thrombosis prophylaxis have been ordered including bilateral sequential compression devices    Procedure Details:   The patient was brought to the operating room and laid supine.  An institutional timeout was called prior to the administration of general anesthesia.  After induction, the patient received antibiotic prophylaxis.  Bilateral SCDs were applied.  The patient's lower abdomen and genital area were prepped and draped in the usual sterile fashion.    A 14 Fr Olivas was inserted into the  bladder and left capped.  A lower midline incision was marked out extending from 2 cm above the pubic bone approximately 6 cm cephalad.    The incision was carried through the skin and subcutaneous tissue to the level of the fascia.  The fascia was incised and the rectus muscle identified.  The rectus muscle was split in the midline.  At this point the bladder was backfilled with 500 cc of saline.  We came through the peritoneum overlying the bladder at which point the dome of the bladder was clearly visible.    We placed 2 stay sutures and entered the bladder sharply, extending our incision for a total of approximately 3 cm.  Using a ring forcep all of the bladder stones were removed en bloc and sent for analysis.  We palpated the bladder to ensure that no further stones were felt.  The bladder was copiously irrigated before proceeding with closure.    The bladder was closed in 2 layers with running Vicryl sutures.  The mucosal layer was closed with 3-0 Vicryl.  After completing this layer, a leak test was performed with 360 ml saline which demonstrated 1 small area of leakage which was oversewn with a figure-of-eight suture.  Repeat leak test was negative.  We then proceeded to close the seromuscular layer with a running 2-0 Vicryl suture.  The stay sutures were tied together. We copiously irrigated and achieved good hemostasis prior to proceeding with fascial closure.    We then closed the fascia with running 0 Vicryl suture starting from each and and tied in the middle.  20 cc total of local anesthetic was administered to the incision.  The skin was closed with a running subcuticular stitch using 4-0 Monocryl.  Dermabond was applied to the incision. The villagomez was set to drainage.     The patient tolerated the procedure well, all counts were correct the end of the procedure.  She was awakened and transferred to the PACU in stable condition.    Complications:  None; patient tolerated the procedure well.     Disposition: PACU - hemodynamically stable.  Condition: stable     Additional Details: Olivas to remain for 1 week (has follow up 10/31)     Attending Attestation: I was present and scrubbed for the entire procedure.    Moose Chun  Phone Number: [phone]

## 2024-10-25 DIAGNOSIS — N39.0 CHRONIC UTI: ICD-10-CM

## 2024-10-28 LAB
APPEARANCE STONE: NORMAL
COMPN STONE: NORMAL
SPECIMEN WT: NORMAL MG

## 2024-10-31 ENCOUNTER — APPOINTMENT (OUTPATIENT)
Dept: UROLOGY | Facility: CLINIC | Age: 24
End: 2024-10-31
Payer: MEDICARE

## 2024-10-31 VITALS — SYSTOLIC BLOOD PRESSURE: 123 MMHG | HEART RATE: 85 BPM | DIASTOLIC BLOOD PRESSURE: 75 MMHG

## 2024-10-31 DIAGNOSIS — N21.0 BLADDER CALCULUS: ICD-10-CM

## 2024-10-31 DIAGNOSIS — N31.9 NEUROGENIC BLADDER: Primary | ICD-10-CM

## 2024-10-31 PROCEDURE — 99024 POSTOP FOLLOW-UP VISIT: CPT | Performed by: NURSE PRACTITIONER

## 2024-11-20 ENCOUNTER — APPOINTMENT (OUTPATIENT)
Dept: UROLOGY | Facility: CLINIC | Age: 24
End: 2024-11-20
Payer: MEDICARE

## 2024-11-20 VITALS — DIASTOLIC BLOOD PRESSURE: 81 MMHG | HEART RATE: 71 BPM | SYSTOLIC BLOOD PRESSURE: 115 MMHG | TEMPERATURE: 95.5 F

## 2024-11-20 DIAGNOSIS — R33.9 URINARY RETENTION: Primary | ICD-10-CM

## 2024-11-20 DIAGNOSIS — N21.0 BLADDER CALCULUS: ICD-10-CM

## 2024-11-20 DIAGNOSIS — R82.90 ABNORMAL URINALYSIS: ICD-10-CM

## 2024-11-20 DIAGNOSIS — Q05.2 CLOSED LUMBAR SPINA BIFIDA WITH HYDROCEPHALUS (MULTI): ICD-10-CM

## 2024-11-20 LAB
POC APPEARANCE, URINE: ABNORMAL
POC BILIRUBIN, URINE: NEGATIVE
POC BLOOD, URINE: ABNORMAL
POC COLOR, URINE: YELLOW
POC GLUCOSE, URINE: NEGATIVE MG/DL
POC KETONES, URINE: NEGATIVE MG/DL
POC LEUKOCYTES, URINE: ABNORMAL
POC NITRITE,URINE: POSITIVE
POC PH, URINE: 6.5 PH
POC PROTEIN, URINE: ABNORMAL MG/DL
POC SPECIFIC GRAVITY, URINE: 1.02
POC UROBILINOGEN, URINE: 1 EU/DL

## 2024-11-20 PROCEDURE — 99024 POSTOP FOLLOW-UP VISIT: CPT | Performed by: STUDENT IN AN ORGANIZED HEALTH CARE EDUCATION/TRAINING PROGRAM

## 2024-11-20 PROCEDURE — 87086 URINE CULTURE/COLONY COUNT: CPT

## 2024-11-20 PROCEDURE — 81003 URINALYSIS AUTO W/O SCOPE: CPT | Performed by: STUDENT IN AN ORGANIZED HEALTH CARE EDUCATION/TRAINING PROGRAM

## 2024-11-20 NOTE — PROGRESS NOTES
Scribed for Dr. Moose Chun by Raghav Bartlett. I, Dr. Moose Chun have personally reviewed and agreed with the information entered by the Virtual Scribe. 11/20/24.    ASSESSMENT:  Problem List Items Addressed This Visit       Closed lumbar spina bifida with hydrocephalus (Multi)    Bladder calculus    Relevant Orders    POCT UA Automated manually resulted (Completed)    CT abdomen pelvis wo IV contrast     Other Visit Diagnoses       Urinary retention    -  Primary    Abnormal urinalysis        Relevant Orders    Urine Culture           PLAN:  #NGB ~ s/p bladder augment  #Bladder stones  #Urinary retention  Now s/p open cystolithotomy with me. (10/24/24)  Has continued bladder irriation pre- and post-op.   Irrigates with normal saline TID with 50-60ml each time.  Will need to increase amount and potentially size of the catheter she uses to irrigate with;     At present catheterizing and irrigating with a 12 Fr catheter  The small catheter size resulted in her inability to clear mucus associated with her bladder augment, the buildup of mucus resulted in symptomatic bladder stones necessitating surgery, it is my recommendation that she caths at least 3x per week with larger sized catheter to clear her mucus and stones more effectively, will prescribe 16 Fr catheters to perform 3-5x weekly, will continue to use 12 Fr catheter's in-between.   Follow up will be scheduled appropriately.     All questions were answered to the patient’s satisfaction.  Patient agrees with the plan and wishes to proceed.  Continue follow-up for ongoing care of his chronic medical conditions.       History of Present Illness (HPI):  Slade presents for a post-op visit.   The patient’s EMR has been reviewed.  Lives in [place], [place].   Occupation: .    Hx of spina bifida, NGB, rUTIs, and bladder stones.   S/p bladder augment with Dr. Marrufo.   Previously followed by Dr. Jensen and Dr. Martinez.   Continues ISC 4x daily, caths per  urethra.   Performs bladder instillations daily.   Has had some sediment return recently.   Denotes recurring UTI's over the past month.    CT A&P (08/29/24) personally reviewed.   Punctate non-obstructing right renal stones (~ 2 mm)  No hydroureteronephrosis bilaterally.   Noted multiple bladder stones; largest measuring ~ 2.8 cm.   Large stone burden; appears worse compared to prior. (02/03/23)  Bladder wall thickening, similar to prior.     S/p CT A&P and cystoscopy with me; see HPI.   Significant stone burden within her bladder.  Suspect probable source of her back pain and urinary issues + UTI's.     Now s/p open cystolithotomy with me. (10/24/24)  Has continued bladder irriation pre- and post-op.   Irrigates with normal saline TID with 50-60ml each time  Will need to increase amount and potentially size of the catheter she uses to irrigate with; currently irrigates with 12 Fr catheters.     TODAY: (11/20/24)  Presents for a post-op visit.   Reports she has been doing well overall.   Symptoms resolved post-operatively.   No recent fevers, chills or flank pain.   No issues with cathing or irrigation.   Catheterizes with 12 Fr catheters.     TO REVIEW: ADDENDUM (10/16/2024)  Patient report significant increase in frequency to nearly every 2 hours since the stone  Will need to increase her catheterization to 8 times daily for her urinary retention and reduced bladder capacity secondary to her large bladder stones  Will need increased supplies temporarily to accommodate those needs    TO REVIEW: Initial visit (09/04/24)  Presents for follow up, CT results, and cystoscopy.   CT results reviewed with patient.     Cystoscopy performed:   Slade Hauser identified using two (2) forms of identification.  Procedure: diagnostic cystourethroscopy  Indications for procedure: rUTIs  Risks, benefits, and alternatives were discussed in detail.   Patient appears to understand and agrees to proceed.   Patient has signed the  procedure consent form.     Cystoscopy findings:  Urethra: normal course and caliber, no evidence of stricture or lesion.  Bladder: normal capacity, no trabeculations, no diverticulum.  Numerous dependent bladder stones seen consistent with CT findings.   Post-cystoscopy: Patient tolerated procedure without complications.    TO REVIEW: Initial visit (08/14/24)  Hx of spina bifida, NGB s/p bladder augment (Dr. Marrufo), rUTIs, and bladder stones.   Previously followed by Dr. Jensen and Dr. Martinez.   Continues ISC 4x daily, caths per urethra.   Performs bladder instillations daily.   Has had some sediment return recently.   Denotes recurring UTI's over the past month.  Associated with low back pain.   Voiding frequency q2hrs, having to cath more frequently as well.   Has been running out of catheters.  Last passed stones >10 years ago.     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    Their hydrocephalus was treated with a shunt. The shunt was inserted in infancy, last revised in 2012 when they presented for distal lengthening. They have a fixed pressure valve. Bladder augmentation around age 12 and caths.  FH: Denies  SH: Non-smoker.     Past Medical History:   Diagnosis Date    Bladder stones     Closed lumbar spina bifida with hydrocephalus (Multi)     Headache, unspecified 05/13/2019    Frequent headaches    Myelomeningocele with hydrocephalus (Multi)     .lumbarmyelomeningocele repaired at birth, no re-tethering.    Neurogenic bladder     s/p bladder augment with Dr. Marrufo    Other symptoms and signs involving the musculoskeletal system 05/13/2019    Limb weakness    Personal history of diseases of the blood and blood-forming organs and certain disorders involving the immune mechanism     History of anemia    Personal history of other diseases of urinary system     History of bladder problems    Presence of cerebrospinal fluid drainage device 09/30/2021     (ventriculoperitoneal) shunt  status    Urinary tract infection     recurring UTI's     Past Surgical History:   Procedure Laterality Date    BLADDER SURGERY  09/18/2013    Bladder Surgery    CYSTOURETHROSCOPY      OTHER SURGICAL HISTORY  09/18/2013    Cranial Tap Of Shunt Tubing    OTHER SURGICAL HISTORY  09/12/2016    Bladder Enterocystoplasty    VENTRICULOPERITONEAL SHUNT       No family history on file.  Social History     Tobacco Use   Smoking Status Never   Smokeless Tobacco Current     Current Outpatient Medications   Medication Sig Dispense Refill    syringe, disposable, 60 mL syringe 60 mL 2 times a day. 10 each 11     No current facility-administered medications for this visit.     Allergies   Allergen Reactions    Animal Dander Unknown    House Dust Unknown    Latex Unknown    Pollen Extracts Unknown     Past medical, surgical, family and social history in the chart was reviewed and is accurate including any additions to what is in this HPI.    REVIEW OF SYSTEMS (ROS):   Constitutional: denies any unintentional weight loss or change in strength.  Integumentary: denies any rashes or pruritus.  Eyes: denies any double vision or eye pain.  Ear/Nose/Mouth/Throat: denies any nosebleeds or gum bleeds.  Cardiovascular: denies any chest pain or syncope.  Respiratory: denies hemoptysis.  Gastrointestinal: denies nausea or vomiting.  Musculoskeletal: denies muscle cramping or weakness.  Neurologic: denies convulsions or seizures.  Hematologic/Lymphatic: denies bleeding tendencies.  Endocrine: denies heat/cold intolerance.  All other systems have been reviewed and are negative unless otherwise noted in the HPI.     OBJECTIVE:  Visit Vitals  /81   Pulse 71   Temp 35.3 °C (95.5 °F)       PHYSICAL EXAM:  Constitutional: No obvious distress.  Eyes: Non-injected conjunctiva, sclera clear, EOMI.  Ears/Nose/Mouth/Throat: No obvious drainage per ears or nose.  Cardiovascular: Extremities are warm and well perfused. No edema, cyanosis or  pallor.  Respiratory: No audible wheezing/stridor; respirations do not appear labored.  Gastrointestinal: Abdomen soft, not distended.  Musculoskeletal: Normal ROM of extremities.  Skin: No obvious rashes or open sores.  Neurologic: Alert and oriented, CN 2-12 grossly intact.  Psychiatric: Answers questions appropriately with normal affect.  Hematologic/Lymphatic/Immunologic: No obvious bruises or sites of spontaneous bleeding.  Genitourinary: No CVA tenderness, bladder not palpable.     LABS & IMAGING:  Lab Results   Component Value Date    WBC 7.0 10/16/2024    HGB 9.1 (L) 10/16/2024    HCT 31.1 (L) 10/16/2024     10/16/2024     10/16/2024    K 4.2 10/16/2024     (H) 10/16/2024    CREATININE 0.47 (L) 10/16/2024    BUN 9 10/16/2024    CO2 21 10/16/2024     Scribed for Dr. Moose Chun by Raghav Bartlett.  I, Dr. Moose Chun have personally reviewed and agreed with the information entered by the Virtual Scribe. 11/20/24.

## 2024-11-21 ENCOUNTER — DOCUMENTATION (OUTPATIENT)
Dept: UROLOGY | Facility: CLINIC | Age: 24
End: 2024-11-21
Payer: MEDICARE

## 2024-11-21 NOTE — PROGRESS NOTES
Nurse contacted [facility] [phone]  to see if pt will be able to have 2 sizes of catheters 12Fr and 16Fr for better irrigation .  [facility] states they will not be able to bill insurance for the same cath 2 sizes.  Nurse contacted [facility] with [facility] on the phone to see what can be done. A PA will need to be done for reason of overage .  [facility] states since they are doing the billing , they will do the PA .  Nurse sent them notes from clinic and OP note today . They will send a script Dr Chun can sign for 16Fr . Pt was given samples of 16Fr in clinic .

## 2024-11-23 LAB — BACTERIA UR CULT: ABNORMAL

## 2024-11-27 ENCOUNTER — APPOINTMENT (OUTPATIENT)
Dept: UROLOGY | Facility: CLINIC | Age: 24
End: 2024-11-27
Payer: MEDICARE

## 2025-06-18 DIAGNOSIS — R39.9 UTI SYMPTOMS: ICD-10-CM

## 2025-06-21 LAB — BACTERIA UR CULT: ABNORMAL

## 2025-06-23 DIAGNOSIS — N30.00 ACUTE CYSTITIS WITHOUT HEMATURIA: Primary | ICD-10-CM

## 2025-06-23 RX ORDER — SULFAMETHOXAZOLE AND TRIMETHOPRIM 800; 160 MG/1; MG/1
1 TABLET ORAL 2 TIMES DAILY
Qty: 20 TABLET | Refills: 0 | Status: SHIPPED | OUTPATIENT
Start: 2025-06-23 | End: 2025-07-03

## 2025-07-11 ENCOUNTER — OFFICE VISIT (OUTPATIENT)
Dept: PRIMARY CARE | Facility: CLINIC | Age: 25
End: 2025-07-11
Payer: MEDICARE

## 2025-07-11 VITALS
BODY MASS INDEX: 22.19 KG/M2 | TEMPERATURE: 98 F | HEIGHT: 60 IN | DIASTOLIC BLOOD PRESSURE: 60 MMHG | RESPIRATION RATE: 16 BRPM | WEIGHT: 113 LBS | SYSTOLIC BLOOD PRESSURE: 110 MMHG

## 2025-07-11 DIAGNOSIS — L90.5 SCAR TISSUE: Primary | ICD-10-CM

## 2025-07-11 PROCEDURE — 3008F BODY MASS INDEX DOCD: CPT | Performed by: NURSE PRACTITIONER

## 2025-07-11 PROCEDURE — 99212 OFFICE O/P EST SF 10 MIN: CPT | Performed by: NURSE PRACTITIONER

## 2025-07-11 ASSESSMENT — ENCOUNTER SYMPTOMS
NAUSEA: 0
CONSTIPATION: 0
DIFFICULTY URINATING: 0
MYALGIAS: 0
FEVER: 0
NUMBNESS: 0
PALPITATIONS: 0
ARTHRALGIAS: 0
FREQUENCY: 0
HEADACHES: 0
VOMITING: 0
DIARRHEA: 0
CHILLS: 0
DYSURIA: 0
SHORTNESS OF BREATH: 0
FATIGUE: 0
COUGH: 0
DIZZINESS: 0

## 2025-07-11 ASSESSMENT — PATIENT HEALTH QUESTIONNAIRE - PHQ9
SUM OF ALL RESPONSES TO PHQ9 QUESTIONS 1 AND 2: 0
2. FEELING DOWN, DEPRESSED OR HOPELESS: NOT AT ALL
1. LITTLE INTEREST OR PLEASURE IN DOING THINGS: NOT AT ALL

## 2025-07-11 NOTE — PROGRESS NOTES
Subjective   Patient ID: Slade Hauser is a 25 y.o. female who presents for Mass (Pt is here due to a bump above the pelvis notice for 3 days  , Dr. Huerta pt ).    HPI   4 days ago noticed grape size bump above pelvis.  Tender with small amount of redness. No drainage.  Bump would come and go for 2 days and is no longer present.    Hx of multiple open bladder surgeries due to neurogenic bladder.   She does shave her perineal area.   Recently on Bactrim for UTI through urology-stopped 7/3.     Review of Systems   Constitutional:  Negative for chills, fatigue and fever.   Respiratory:  Negative for cough and shortness of breath.    Cardiovascular:  Negative for chest pain, palpitations and leg swelling.   Gastrointestinal:  Negative for constipation, diarrhea, nausea and vomiting.   Genitourinary:  Negative for difficulty urinating, dysuria, frequency, pelvic pain, urgency, vaginal bleeding, vaginal discharge and vaginal pain.   Musculoskeletal:  Negative for arthralgias and myalgias.   Skin:  Negative for rash.   Neurological:  Negative for dizziness, numbness and headaches.   All other systems reviewed and are negative.      Objective   /60 (BP Location: Left arm, Patient Position: Sitting, BP Cuff Size: Adult)   Temp 36.7 °C (98 °F) (Temporal)   Resp 16   Ht (!) 1.524 m (5')   Wt 51.3 kg (113 lb)   BMI 22.07 kg/m²     Physical Exam  Vitals reviewed.   Constitutional:       Appearance: Normal appearance. She is normal weight.   Eyes:      Conjunctiva/sclera: Conjunctivae normal.   Cardiovascular:      Rate and Rhythm: Normal rate and regular rhythm.      Heart sounds: Normal heart sounds.   Pulmonary:      Effort: Pulmonary effort is normal.      Breath sounds: Normal breath sounds.   Abdominal:      General: Abdomen is flat. Bowel sounds are normal.      Palpations: Abdomen is soft.   Musculoskeletal:         General: Normal range of motion.      Cervical back: Neck supple.   Skin:     General: Skin is  warm and dry.      Comments: Scarring midline of abdomen. To left of scar over pubic bone there is scarring without palpable mass. No erythema/tenderness/swelling present. No palpable hernia.   Neurological:      General: No focal deficit present.      Mental Status: She is alert and oriented to person, place, and time.   Psychiatric:         Mood and Affect: Mood normal.         Assessment/Plan   Assessment & Plan  Scar tissue  -No mass present currently.   -Advised patient to call if mass returns to have assessed. Discussed s/s of infection.   -Consider US to rule out hernia vs cyst.   -Follow up with urology as scheduled, recommend mentioning mass to them.   -Call or return to office as needed if these symptoms worsen or fail to improve as anticipated.  -Follow up with Dr. Huerta as scheduled.

## 2025-07-23 ENCOUNTER — TELEPHONE (OUTPATIENT)
Dept: UROLOGY | Facility: HOSPITAL | Age: 25
End: 2025-07-23
Payer: MEDICARE

## 2025-07-23 DIAGNOSIS — R39.9 UTI SYMPTOMS: ICD-10-CM

## 2025-07-23 NOTE — TELEPHONE ENCOUNTER
Patient called yesterday re: potential UTI. Messaged Dr. Burton. Orders for urine culture placed. Left detailed message for patient to drop off a sample.    Zakiya Fan RN

## 2025-07-26 LAB — BACTERIA UR CULT: ABNORMAL

## 2025-07-29 DIAGNOSIS — N30.00 ACUTE CYSTITIS WITHOUT HEMATURIA: Primary | ICD-10-CM

## 2025-07-29 LAB — BACTERIA UR CULT: ABNORMAL

## 2025-07-29 RX ORDER — CIPROFLOXACIN 500 MG/1
500 TABLET, FILM COATED ORAL 2 TIMES DAILY
Qty: 14 TABLET | Refills: 0 | Status: SHIPPED | OUTPATIENT
Start: 2025-07-29 | End: 2025-08-05

## 2025-08-29 ENCOUNTER — APPOINTMENT (OUTPATIENT)
Dept: PRIMARY CARE | Facility: CLINIC | Age: 25
End: 2025-08-29
Payer: MEDICARE

## 2025-08-29 ENCOUNTER — APPOINTMENT (OUTPATIENT)
Dept: RADIOLOGY | Facility: HOSPITAL | Age: 25
End: 2025-08-29
Payer: COMMERCIAL

## 2025-11-12 ENCOUNTER — APPOINTMENT (OUTPATIENT)
Dept: UROLOGY | Facility: CLINIC | Age: 25
End: 2025-11-12
Payer: MEDICARE

## 2025-11-19 ENCOUNTER — APPOINTMENT (OUTPATIENT)
Dept: UROLOGY | Facility: CLINIC | Age: 25
End: 2025-11-19
Payer: MEDICARE

## (undated) DEVICE — SUTURE, VICRYL, 3-0, 27 IN, SH

## (undated) DEVICE — SPONGE, LAP, XRAY DECT, 18IN X 18IN, W/LOOP, STERILE

## (undated) DEVICE — STAPLER, SKIN PROXIMATE, 35 WIDE

## (undated) DEVICE — Device

## (undated) DEVICE — SPONGE GAUZE, XRAY SC+RFID, 4X4 16 PLY, STERILE

## (undated) DEVICE — MANIFOLD, 4 PORT NEPTUNE STANDARD

## (undated) DEVICE — BAG, DRAINAGE, URINARY, URINE METER, INFECTION CONTROL, LF, 350ML

## (undated) DEVICE — SUTURE, VICRYL, 2-0, 27 IN, UR-6, VIOLET

## (undated) DEVICE — SUTURE, VICRYL, 0, SH 27 TAPER NEEDLE, UNDYED, BRAIDED

## (undated) DEVICE — CATHETER, URETHRAL, FOLEY, 2 WAY, BARDEX IC, 14 FR, 5 CC, SILICONE

## (undated) DEVICE — APPLICATOR, CHLORAPREP, W/ORANGE TINT, 26ML